# Patient Record
Sex: FEMALE | Race: BLACK OR AFRICAN AMERICAN | Employment: UNEMPLOYED | URBAN - METROPOLITAN AREA
[De-identification: names, ages, dates, MRNs, and addresses within clinical notes are randomized per-mention and may not be internally consistent; named-entity substitution may affect disease eponyms.]

---

## 2017-01-06 ENCOUNTER — HOSPITAL ENCOUNTER (EMERGENCY)
Age: 57
Discharge: HOME | End: 2017-01-06
Attending: EMERGENCY MEDICINE
Payer: MEDICARE

## 2017-01-06 ENCOUNTER — OFFICE VISIT (OUTPATIENT)
Dept: ORTHOPEDIC SURGERY | Facility: CLINIC | Age: 57
End: 2017-01-06

## 2017-01-06 VITALS
HEART RATE: 82 BPM | SYSTOLIC BLOOD PRESSURE: 115 MMHG | HEIGHT: 66 IN | DIASTOLIC BLOOD PRESSURE: 71 MMHG | BODY MASS INDEX: 36.16 KG/M2 | WEIGHT: 225 LBS

## 2017-01-06 VITALS
RESPIRATION RATE: 20 BRPM | HEIGHT: 66 IN | BODY MASS INDEX: 36.16 KG/M2 | HEART RATE: 101 BPM | DIASTOLIC BLOOD PRESSURE: 82 MMHG | SYSTOLIC BLOOD PRESSURE: 117 MMHG | WEIGHT: 225 LBS | OXYGEN SATURATION: 100 % | TEMPERATURE: 97.2 F

## 2017-01-06 DIAGNOSIS — M54.9 BACK PAIN, UNSPECIFIED BACK LOCATION, UNSPECIFIED BACK PAIN LATERALITY, UNSPECIFIED CHRONICITY: ICD-10-CM

## 2017-01-06 DIAGNOSIS — G89.29 CHRONIC PAIN OF LEFT KNEE: ICD-10-CM

## 2017-01-06 DIAGNOSIS — M17.12 PRIMARY OSTEOARTHRITIS OF LEFT KNEE: ICD-10-CM

## 2017-01-06 DIAGNOSIS — M47.816 OSTEOARTHRITIS OF LUMBAR SPINE, UNSPECIFIED SPINAL OSTEOARTHRITIS COMPLICATION STATUS: Primary | ICD-10-CM

## 2017-01-06 DIAGNOSIS — M25.562 CHRONIC PAIN OF LEFT KNEE: ICD-10-CM

## 2017-01-06 DIAGNOSIS — M54.41 ACUTE RIGHT-SIDED LOW BACK PAIN WITH RIGHT-SIDED SCIATICA: Primary | ICD-10-CM

## 2017-01-06 PROCEDURE — 74011636637 HC RX REV CODE- 636/637: Performed by: PHYSICIAN ASSISTANT

## 2017-01-06 PROCEDURE — A9270 NON-COVERED ITEM OR SERVICE: HCPCS | Performed by: PHYSICIAN ASSISTANT

## 2017-01-06 PROCEDURE — 99282 EMERGENCY DEPT VISIT SF MDM: CPT

## 2017-01-06 RX ORDER — MELOXICAM 15 MG/1
1 TABLET ORAL DAILY
COMMUNITY
Start: 2016-12-05 | End: 2017-01-06

## 2017-01-06 RX ORDER — TRAMADOL HYDROCHLORIDE 50 MG/1
50 TABLET ORAL
COMMUNITY
End: 2017-01-06

## 2017-01-06 RX ORDER — METHOCARBAMOL 500 MG/1
500 TABLET, FILM COATED ORAL 4 TIMES DAILY
Qty: 20 TAB | Refills: 0 | Status: SHIPPED | OUTPATIENT
Start: 2017-01-06 | End: 2017-02-28 | Stop reason: SDUPTHER

## 2017-01-06 RX ORDER — TRIAMCINOLONE ACETONIDE 1 MG/ML
LOTION TOPICAL
Refills: 0 | COMMUNITY
Start: 2016-10-31 | End: 2017-01-06

## 2017-01-06 RX ORDER — HYDROCODONE BITARTRATE AND ACETAMINOPHEN 7.5; 325 MG/1; MG/1
TABLET ORAL
Refills: 0 | COMMUNITY
Start: 2016-12-05 | End: 2017-01-06

## 2017-01-06 RX ORDER — PREDNISONE 20 MG/1
TABLET ORAL
Qty: 15 TAB | Refills: 0 | Status: SHIPPED | OUTPATIENT
Start: 2017-01-07 | End: 2018-01-10

## 2017-01-06 RX ORDER — HYDROCODONE BITARTRATE AND ACETAMINOPHEN 5; 325 MG/1; MG/1
1 TABLET ORAL
Qty: 20 TAB | Refills: 0 | Status: SHIPPED | OUTPATIENT
Start: 2017-01-06 | End: 2018-01-10

## 2017-01-06 RX ORDER — PREDNISONE 20 MG/1
TABLET ORAL
Refills: 0 | COMMUNITY
Start: 2016-10-18 | End: 2017-01-06

## 2017-01-06 RX ORDER — HYDRALAZINE HYDROCHLORIDE 25 MG/1
TABLET, FILM COATED ORAL
Refills: 0 | COMMUNITY
Start: 2016-11-21 | End: 2018-01-10

## 2017-01-06 RX ORDER — ALPRAZOLAM 0.5 MG/1
1 TABLET ORAL
Refills: 0 | COMMUNITY
Start: 2017-01-01

## 2017-01-06 RX ORDER — PREDNISONE 20 MG/1
60 TABLET ORAL
Status: COMPLETED | OUTPATIENT
Start: 2017-01-06 | End: 2017-01-06

## 2017-01-06 RX ORDER — TRIAMCINOLONE ACETONIDE 1 MG/G
OINTMENT TOPICAL
Refills: 0 | COMMUNITY
Start: 2016-10-25 | End: 2018-01-10

## 2017-01-06 RX ORDER — DEXTROAMPHETAMINE SACCHARATE, AMPHETAMINE ASPARTATE, DEXTROAMPHETAMINE SULFATE AND AMPHETAMINE SULFATE 5; 5; 5; 5 MG/1; MG/1; MG/1; MG/1
TABLET ORAL
COMMUNITY
Start: 2017-01-02

## 2017-01-06 RX ORDER — OXYCODONE AND ACETAMINOPHEN 5; 325 MG/1; MG/1
1 TABLET ORAL
Status: DISCONTINUED | OUTPATIENT
Start: 2017-01-06 | End: 2017-01-06 | Stop reason: HOSPADM

## 2017-01-06 RX ORDER — HYDROCODONE BITARTRATE AND ACETAMINOPHEN 5; 325 MG/1; MG/1
TABLET ORAL
Refills: 0 | COMMUNITY
Start: 2016-11-27 | End: 2017-01-06

## 2017-01-06 RX ORDER — CYCLOBENZAPRINE HCL 5 MG
2 TABLET ORAL
Refills: 0 | COMMUNITY
Start: 2016-12-09 | End: 2017-01-06

## 2017-01-06 RX ORDER — NAPROXEN 500 MG/1
1 TABLET ORAL 2 TIMES DAILY
Refills: 0 | COMMUNITY
Start: 2016-12-09 | End: 2017-01-06

## 2017-01-06 RX ORDER — DEXTROAMPHETAMINE SACCHARATE, AMPHETAMINE ASPARTATE, DEXTROAMPHETAMINE SULFATE AND AMPHETAMINE SULFATE 2.5; 2.5; 2.5; 2.5 MG/1; MG/1; MG/1; MG/1
1 TABLET ORAL 2 TIMES DAILY
Refills: 0 | COMMUNITY
Start: 2016-10-26 | End: 2018-01-10

## 2017-01-06 RX ORDER — METHYLPREDNISOLONE 4 MG/1
TABLET ORAL
COMMUNITY
Start: 2016-11-28 | End: 2017-01-06

## 2017-01-06 RX ADMIN — PREDNISONE 60 MG: 20 TABLET ORAL at 17:32

## 2017-01-06 NOTE — Clinical Note
Drink plenty of water. Please take Tylenol (Acetaminophen) Extra Strength 500 mg tablets, 2 tablets by mouth every 6-8 hours as needed for pain and or fever. NOT TO EXCEED 4000 MG OF ACETAMINOPHEN IN A 24 HOURS PERIOD. You may also take Motrin (Ibupr ofen) 200mg tablets, 3 tablets by mouth every 6-8 hours as needed for pain and or fever, to be taken with food. Do not take other NSAIDS (Non-Steroidal Anti-inflammatories) or medications containing: e.g., Celebrex (Celecoxib), Mobic (Meloxicam), Naproxe n (Naprosyn), Goodies Powder, Aspirin etc... While taking Ibuprofen (Also known as Motrin, Advil). Avoid lifting/pushing/pulling > 10 lbs x 1 week. Warm compresses to the affected area. If a splint was applied use as directed. If you have a b reak in the skin, keep the wound clean and dry. Change any dressings once daily. Use all medications as directed. Gentle range of motion as tolerated. Review patient information handouts given to you at the time of discharge as this conta ins information detrimental to your condition. PLEASE FOLLOW-UP AS DIRECTED WITHOUT FAIL WITHIN THE TIME FRAME RECOMMENDED AS FAILURE TO DO SO COULD RESULT IN WORSENING OF YOUR PHYSICAL CONDITION, DEATH, AND OR PERMANENT DISABILITY. RETURN TO THE  EMERGENCY DEPARTMENT IF YOU ARE UNABLE TO FOLLOW-UP AS DIRECTED. RETURN TO THE EMERGENCY DEPARTMENT IF YOU HAVE SYMPTOMS THAT DO NOT IMPROVE WITH TREATMENT, NEW SYMPTOMS, WORSENING SYMPTOMS, OR ANY OTHER CONCERNS.

## 2017-01-06 NOTE — ED NOTES
Written and verbal discharge instructions given. Patient verbalizes understanding of same. Patient denies  further questions about treatment and discharge instructions. Left ED with patent airway and steady gait. Arm band removed shredded. Patient left ED with 3 RX. Pt medicated w/ Percocet, dtr driving pt home.

## 2017-01-06 NOTE — ED PROVIDER NOTES
HPI Comments: Rosalba Sever is a  61 y.o. Morbidly obese  [place] female h/o R sided LBP w/ R Sided Sciatica 12/2016, L Knee Pain, DJD L Knee, HTN, NIDDM, Anxiety, ADHD presents to the ED via POV c/o dull aching right sided lower back pain that radiates down her right buttock terminating at the right knee x 1 week, which same sx as she had in early December which she was evaluated here in the ED for as well. Pain had improved but then came back. Denies fever, chills, dizziness, HA, neck pain/stiffness, cp, sob, palps, cough, leg swelling, abd pain, n/v/d, constipation, brbpr, melena, loss of bowel/bladder control, flank pain, blood in urine, saddle anesthesia, weakness, difficulty ambulating. Scheduled to see [address] [place] [address] 1/23/17 per TODAY'S DR. Hamlin Adjutant. CARLOS'S NOTE. Physical Therapy Scheduled 1/13/17. PSX:   Hysterectomy, BTL    Patient is a 61 y.o. female presenting with back pain and leg pain. Back Pain    Associated symptoms include numbness (Alternating Pain w/ Paresthesia shooting down from right lower back terminating at right knee) and leg pain. Pertinent negatives include no chest pain, no fever, no headaches, no abdominal pain, no dysuria and no weakness. Leg Pain    Associated symptoms include numbness (Alternating Pain w/ Paresthesia shooting down from right lower back terminating at right knee) and back pain (R sided pain radiating down right buttock, terminating right knee).         Past Medical History:   Diagnosis Date    Arthritis     Back pain     Diabetes ([address]      pt states \"pre-diabetes\"    Left knee pain 12/09/2015     Dr. Jason Garber       Past Surgical History:   Procedure Laterality Date    Hx myomectomy      Hx hysterectomy  2008    Hx tubal ligation           Family History:   Problem Relation Age of Onset    Heart Disease Mother     Hypertension Father     Diabetes Father        Social History     Social History    Marital status: LEGALLY  Spouse name: N/A    Number of children: N/A    Years of education: N/A     Occupational History    Not on file. Social History Main Topics    Smoking status: Former Smoker    Smokeless tobacco: Not on file    Alcohol use No    Drug use: No    Sexual activity: Not on file     Other Topics Concern    Not on file     Social History Narrative         ALLERGIES: Review of patient's allergies indicates no known allergies. Review of Systems   Constitutional: Negative for chills and fever. HENT: Negative for ear pain and sore throat. Eyes: Negative for pain and visual disturbance. Respiratory: Negative for cough and shortness of breath. Cardiovascular: Negative for chest pain and palpitations. Gastrointestinal: Negative for abdominal pain, diarrhea, nausea and vomiting. Genitourinary: Negative for decreased urine volume, difficulty urinating, dysuria, flank pain, frequency, hematuria and urgency. Musculoskeletal: Positive for arthralgias (R sided pain radiating down right buttock, terminating right knee) and back pain (R sided pain radiating down right buttock, terminating right knee). Negative for joint swelling. Skin: Negative for color change, pallor, rash and wound. Neurological: Positive for numbness (Alternating Pain w/ Paresthesia shooting down from right lower back terminating at right knee). Negative for dizziness, syncope, weakness and headaches. Psychiatric/Behavioral: Negative for behavioral problems. The patient is not nervous/anxious. Vitals:    01/06/17 1610   BP: 117/82   Pulse: (!) 101   Resp: 20   Temp: 97.2 °F (36.2 °C)   SpO2: 100%   Weight: 102.1 kg (225 lb)   Height: 5' 6\" (1.676 m)            Physical Exam   Constitutional: She is oriented to person, place, and time. She appears well-developed and well-nourished. No distress. Cardiovascular: Normal rate, regular rhythm, normal heart sounds and intact distal pulses.   Exam reveals no gallop and no friction rub.    No murmur heard. Pulmonary/Chest: Effort normal and breath sounds normal. No respiratory distress. She has no wheezes. She has no rales. Abdominal: Soft. Bowel sounds are normal. There is no tenderness. Musculoskeletal:        Right hip: Normal.        Left hip: Normal.        Right knee: Normal.        Left knee: Normal.        Thoracic back: Normal.        Lumbar back: She exhibits tenderness, pain and spasm. She exhibits normal range of motion, no bony tenderness, no swelling, no edema, no deformity, no laceration and normal pulse. Right upper leg: She exhibits no tenderness, no bony tenderness, no swelling, no edema, no deformity and no laceration. Left upper leg: Normal.        Right lower leg: Normal. She exhibits no tenderness, no bony tenderness, no swelling, no edema, no deformity and no laceration. Left lower leg: Normal. She exhibits no tenderness, no bony tenderness, no swelling, no edema, no deformity and no laceration. Lumbar: FROM w/ pain on flexion/extension. - SLR. + R Paraspinal muscle TTP. + R Piriformis region TTP. No midline TTP. MS 5/5 BUE/BLE. Gait normal. Normal inspection of the lumbar region. No calf edema/swelling or TTP. Neurological: She is alert and oriented to person, place, and time. She has normal strength. She is not disoriented. No cranial nerve deficit or sensory deficit. No focal neuro deficits. MS 5/5 BUE/BLE. Gait is normal.    Skin: Skin is warm, dry and intact. No abrasion, no bruising, no ecchymosis, no laceration and no rash noted. She is not diaphoretic. No cyanosis or erythema. No pallor. Nursing note and vitals reviewed.        MDM  Number of Diagnoses or Management Options  Acute right-sided low back pain with right-sided sciatica: new and requires workup  Diagnosis management comments: DDX: Sciatica, Lumbar radiculopathy, Back Sprain, Back Strain, DJD, HNP, Epidural abscess, Cauda Equina Syndrome, Contusion, Pyelonephritis, Renal Colic, Rib Fracture, Rib Contusion, Costochondritis, Pleurisy, Pleurodynia, Pneumothorax, Thoracic Aneurysm, Abdominal Aortic Aneurysm, Nephro-ureteral Calculus, Acute Myocardial Infarction. Afebrile, non-toxic in appearance. Reconciling meds from Ext Rx show visits to Dr. Prashant Hunt and his NP Mago Harrell, Also Dr. Negar Lynch. Also, Dr. Riya Raymundo all within 3 month period, all Family Practice Providers, not necessarily for narcotics but concerning as she only needs 1 FP. Reviewed workup results, any meds, and discharge instructions OR admission plan with patient and any family present. Answered all questions. Cynda Libman, PA  5:05 PM    PLEASE FOLLOW-UP AS DIRECTED WITHOUT FAIL WITHIN THE TIME FRAME RECOMMENDED AS FAILURE TO DO SO COULD RESULT IN WORSENING OF YOUR PHYSICAL CONDITION, DEATH, AND OR PERMANENT DISABILITY. RETURN TO THE EMERGENCY DEPARTMENT AT IF YOU ARE UNABLE TO FOLLOW-UP AS DIRECTED. RETURN TO THE EMERGENCY DEPARTMENT AT ONCE IF YOU HAVE SYMPTOMS THAT DO NOT IMPROVE WITH TREATMENT, NEW SYMPTOMS, WORSENING SYMPTOMS, OR ANY OTHER CONCERNS. THE PATIENT AGREES WITH THE DISCHARGE PLAN AND FOLLOW-UP INSTRUCTIONS. THE PATIENT AGREES TO REVIEW ALL HANDOUTS. Amount and/or Complexity of Data Reviewed  Decide to obtain previous medical records or to obtain history from someone other than the patient: yes  Review and summarize past medical records: yes    Risk of Complications, Morbidity, and/or Mortality  Presenting problems: moderate  Diagnostic procedures: low  Management options: moderate    Patient Progress  Patient progress: stable      Procedures  Diagnosis:   1.  Acute right-sided low back pain with right-sided sciatica          Disposition: HOME    Follow-up Information     Follow up With Details Comments Contact Info    [facility] EM[facility]  As needed, If symptoms worsen [address] [facility] Tiara Talley MD Call today Schedule appointment to be seen within 3 days for re-evaluation without fail. [id number] [id number] [phone]            Patient's Medications   Start Taking    HYDROCODONE-ACETAMINOPHEN (NORCO) 5-325 MG PER TABLET    Take 1 Tab by mouth every four (4) hours as needed (BREAKTHROUGH PAIN ONLY, DO NOT FILL UNLESS PREDNISONE, ROBAXIN IS FILLED.). Max Daily Amount: 6 Tabs. METHOCARBAMOL (ROBAXIN) 500 MG TABLET    Take 1 Tab by mouth four (4) times daily. PREDNISONE (DELTASONE) 20 MG TABLET    Begin taking tomorrow Saturday 1/7/17 as you received your initial dose while in the ER. 3 tabs po every day x 2 days, then 2 tabs po every day x 3 days, then 1 tab po every day x 3 days. Continue Taking    ALPRAZOLAM (XANAX) 0.5 MG TABLET    Take 1 mg by mouth nightly as needed for Anxiety or Sleep. 1/1/17, QTY#30, 30 day supply. Dr. Cecelia Rhoades (ADDERALL) 10 MG TABLET    Take 1 Tab by mouth two (2) times a day. 10/26/16, QTY#60, 30 days, Dr. Cecelia Rhoades (ADDERALL) 20 MG TABLET    1/2/17, QTY#45, 30 days. HYDRALAZINE (APRESOLINE) 25 MG TABLET    11/21/16, QTY#30, 10 days. Ms. Ro Stone. METFORMIN (GLUCOPHAGE) 500 MG TABLET    Take 500 mg by mouth daily (with breakfast). TRIAMCINOLONE ACETONIDE (KENALOG) 0.1 % OINTMENT    10/25/16, QTY#454 g, 30 days. Dr. Joyce Pandey   These Medications have changed    No medications on file   Stop Taking    CYCLOBENZAPRINE (FLEXERIL) 5 MG TABLET    Take 2 Tabs by mouth nightly. 12/9/16 QTY#10, 5 days. MARCOS Devine    HYDROCODONE-ACETAMINOPHEN (NORCO) 5-325 MG PER TABLET    11/27/16, QTY#20, 3 days, Dr. Ramona Luna    HYDROCODONE-ACETAMINOPHEN (NORCO) 7.5-325 MG PER TABLET    12/5/16, QTY#30, 8 days, Dr. Lauren Thomas    IBUPROFEN PO    Take  by mouth. MELOXICAM (MOBIC) 15 MG TABLET    Take 1 Tab by mouth daily. 12/5/16, QTY#30, 30 days,    METHYLPREDNISOLONE (MEDROL DOSEPACK) 4 MG TABLET    11/28/16, QTY#21, 6 days    NAPROXEN (NAPROSYN) 500 MG TABLET    Take 1 Tab by mouth two (2) times daily (with meals). NAPROXEN (NAPROSYN) 500 MG TABLET    Take 1 Tab by mouth two (2) times a day. 12/9/16, QTY#20 MARCOS Szymanski    PREDNISONE (DELTASONE) 20 MG TABLET    10/18/16, QTY#5    TRAMADOL (ULTRAM) 50 MG TABLET    Take 1 Tab by mouth every six (6) hours as needed for Pain. Max Daily Amount: 200 mg. Indications: NEUROPATHIC PAIN    TRAMADOL (ULTRAM) 50 MG TABLET    Take 50 mg by mouth every six (6) hours as needed for Pain. TRIAMCINOLONE (KENALOG) 0.1 % LOTION    10/31/16, OJZ950 g, 30 days. Dr. Sravanthi Kuo. Meg[place]       No results found for this or any previous visit (from the past 24 hour(s)).

## 2017-01-06 NOTE — DISCHARGE INSTRUCTIONS
InsightETE Activation    Thank you for requesting access to [facility]. Please follow the instructions below to securely access and download your online medical record. InsightETE allows you to send messages to your doctor, view your test results, renew your prescriptions, schedule appointments, and more. How Do I Sign Up? 1. In your internet browser, go to [email or web address]  2. Click on the First Time User? Click Here link in the Sign In box. You will be redirect to the New Member Sign Up page. 3. Enter your InsightETE Access Code exactly as it appears below. You will not need to use this code after youve completed the sign-up process. If you do not sign up before the expiration date, you must request a new code. InsightETE Access Code: [email or web address]  Expires: 2017 11:55 AM (This is the date your InsightETE access code will )    4. Enter the last four digits of your Social Security Number (xxxx) and Date of Birth (mm/dd/yyyy) as indicated and click Submit. You will be taken to the next sign-up page. 5. Create a InsightETE ID. This will be your InsightETE login ID and cannot be changed, so think of one that is secure and easy to remember. 6. Create a InsightETE password. You can change your password at any time. 7. Enter your Password Reset Question and Answer. This can be used at a later time if you forget your password. 8. Enter your e-mail address. You will receive e-mail notification when new information is available in [address]. 9. Click Sign Up. You can now view and download portions of your medical record. 10. Click the Download Summary menu link to download a portable copy of your medical information. Additional Information    If you have questions, please visit the Frequently Asked Questions section of the InsightETE website at [email or web address] [email or web address]. [email or web address]/. Remember, InsightETE is NOT to be used for urgent needs. For medical emergencies, dial 911.
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
## 2017-01-06 NOTE — PATIENT INSTRUCTIONS
Learning About How to Have a Healthy Back  What causes back pain? Back pain is often caused by overuse, strain, or injury. For example, people often hurt their backs playing sports or working in the yard, being jolted in a car accident, or lifting something too heavy. Aging plays a part too. Your bones and muscles tend to lose strength as you age, which makes injury more likely. The spongy discs between the bones of the spine (vertebrae) may suffer from wear and tear and no longer provide enough cushion between the bones. A disc that bulges or breaks open (herniated disc) can press on nerves, causing back pain. In some people, back pain is the result of arthritis, broken vertebrae caused by bone loss (osteoporosis), illness, or a spine problem. Although most people have back pain at one time or another, there are steps you can take to make it less likely. How can you have a healthy back? Reduce stress on your back through good posture  Slumping or slouching alone may not cause low back pain. But after the back has been strained or injured, bad posture can make pain worse. · Sleep in a position that maintains your back's normal curves and on a mattress that feels comfortable. Sleep on your side with a pillow between your knees, or sleep on your back with a pillow under your knees. These positions can reduce strain on your back. · Stand and sit up straight. \"Good posture\" generally means your ears, shoulders, and hips are in a straight line. · If you must stand for a long time, put one foot on a stool, ledge, or box. Switch feet every now and then. · Sit in a chair that is low enough to let you place both feet flat on the floor with both knees nearly level with your hips. If your chair or desk is too high, use a footrest to raise your knees. Place a small pillow, a rolled-up towel, or a lumbar roll in the curve of your back if you need extra support.   · Try a kneeling chair, which helps tilt your hips forward. This takes pressure off your lower back. · Try sitting on an exercise ball. It can rock from side to side, which helps keep your back loose. · When driving, keep your knees nearly level with your hips. Sit straight, and drive with both hands on the steering wheel. Your arms should be in a slightly bent position. Reduce stress on your back through careful lifting  · Squat down, bending at the hips and knees only. If you need to, put one knee to the floor and extend your other knee in front of you, bent at a right angle (half kneeling). · Press your chest straight forward. This helps keep your upper back straight while keeping a slight arch in your low back. · Hold the load as close to your body as possible, at the level of your belly button (navel). · Use your feet to change direction, taking small steps. · Lead with your hips as you change direction. Keep your shoulders in line with your hips as you move. · Set down your load carefully, squatting with your knees and hips only. Exercise and stretch your back  · Do some exercise on most days of the week, if your doctor says it is okay. You can walk, run, swim, or cycle. · Stretch your back muscles. Here are a few exercises to try:  Marguerite Able on your back, and gently pull one bent knee to your chest. Put that foot back on the floor, and then pull the other knee to your chest.  ¨ Do pelvic tilts. Lie on your back with your knees bent. Tighten your stomach muscles. Pull your belly button (navel) in and up toward your ribs. You should feel like your back is pressing to the floor and your hips and pelvis are slightly lifting off the floor. Hold for 6 seconds while breathing smoothly. ¨ Sit with your back flat against a wall. · Keep your core muscles strong. The muscles of your back, belly (abdomen), and buttocks support your spine. ¨ Pull in your belly and imagine pulling your navel toward your spine. Hold this for 6 seconds, then relax.  Remember to keep breathing normally as you tense your muscles. ¨ Do curl-ups. Always do them with your knees bent. Keep your low back on the floor, and curl your shoulders toward your knees using a smooth, slow motion. Keep your arms folded across your chest. If this bothers your neck, try putting your hands behind your neck (not your head), with your elbows spread apart. ¨ Lie on your back with your knees bent and your feet flat on the floor. Tighten your belly muscles, and then push with your feet and raise your buttocks up a few inches. Hold this position 6 seconds as you continue to breathe normally, then lower yourself slowly to the floor. Repeat 8 to 12 times. ¨ If you like group exercise, try Pilates or yoga. These classes have poses that strengthen the core muscles. Lead a healthy lifestyle  · Stay at a healthy weight to avoid strain on your back. · Do not smoke. Smoking increases the risk of osteoporosis, which weakens the spine. If you need help quitting, talk to your doctor about stop-smoking programs and medicines. These can increase your chances of quitting for good. Where can you learn more? Go to http://papa-kathy.info/. Enter L315 in the search box to learn more about \"Learning About How to Have a Healthy Back. \"  Current as of: May 23, 2016  Content Version: 11.1  © 5596-9942 [employer]. Care instructions adapted under license by [employer] (which disclaims liability or warranty for this information). If you have questions about a medical condition or this instruction, always ask your healthcare professional. [place] [address] any warranty or liability for your use of this information.

## 2017-01-06 NOTE — ED TRIAGE NOTES
Pt presents to ER c/o constant right lower back pain radiating to right thigh x 2 weeks. Pt states she saw Dr Crescencio Wynn today for same. He recommended physical therapy for diagnosis of sciatica. States scheduled for 1/13. States no rx was given.   States \"I need something for this pain to take\"

## 2017-01-13 ENCOUNTER — HOSPITAL ENCOUNTER (OUTPATIENT)
Dept: PHYSICAL THERAPY | Age: 57
Discharge: HOME | End: 2017-01-13
Payer: MEDICARE

## 2017-01-13 PROCEDURE — 97110 THERAPEUTIC EXERCISES: CPT

## 2017-01-13 PROCEDURE — 97162 PT EVAL MOD COMPLEX 30 MIN: CPT

## 2017-01-13 PROCEDURE — G8984 CARRY CURRENT STATUS: HCPCS

## 2017-01-13 PROCEDURE — G8985 CARRY GOAL STATUS: HCPCS

## 2017-01-13 NOTE — PROGRESS NOTES
[facility]  [address]  [place], [address]  [phone] fax    Plan of Care/ Statement of Necessity for Physical Therapy Services    Patient name: Nilson Tipton Start of Care: 2017   Referral source: Amee Orta MD : 1960    Medical Diagnosis: Spondylosis without myelopathy or radiculopathy, lumbar region [M47.816]  Dorsalgia, unspecified [M54.9]   Onset Date:1 month    Treatment Diagnosis: SIJ dysfunction & LBP   Prior Hospitalization: see medical history Provider#: [id number]   Medications: Verified on Patient summary List    Comorbidities: L knee OA, diabetes, Arthritis, none other per pt report   Prior Level of Function: no LBP, walking for exercise, [facility] including [facility] without limitations d/t LBP      The Plan of Care and following information is based on the information from the initial evaluation. Assessment/ key information: Pt is a 61year old female presenting with insidious onset of LBP with intermittent referral or RLE paresthesias primarily in ant thigh & sometimes referring to the dorsum of the R foot. Pt reports pain limits standing, walking tolerance and ease of housework. Signs and symptoms appear consistent with SIJ dysfunction with R sided myofascial restrictions primarily around the R gluteals/piriformis with (+) R SIMI, (+) SIJ compression, (+) R gaenslyn's, (+) R thigh thrust, limited R hip ER mobility, TTP R PSIS, antalgic gait pattern, and weakness in R gluteals. Pt will benefit from skilled PT to address her impairments and improve her level of function.     Evaluation Complexity History MEDIUM  Complexity : 1-2 comorbidities / personal factors will impact the outcome/ POC ; Examination MEDIUM Complexity : 3 Standardized tests and measures addressing body structure, function, activity limitation and / or participation in recreation  ;Presentation LOW Complexity : Stable, uncomplicated  ;Clinical Decision Making MEDIUM Complexity : FOTO score of 26-74  Overall Complexity Rating: MEDIUM  Problem List: pain affecting function, decrease ROM, decrease strength, impaired gait/ balance, decrease ADL/ functional abilitiies, decrease activity tolerance, decrease flexibility/ joint mobility and decrease transfer abilities   Treatment Plan may include any combination of the following: Therapeutic exercise, Therapeutic activities, Neuromuscular re-education, Physical agent/modality, Manual therapy, Patient education, Self Care training and Functional mobility training  Patient / Family readiness to learn indicated by: asking questions, trying to perform skills and interest  Persons(s) to be included in education: patient (P)  Barriers to Learning/Limitations: None  Patient Goal (s): To get back the way I was at first.  Patient Self Reported Health Status: Not reported  Rehabilitation Potential: fair    Short Term Goals: To be accomplished in 2 weeks:   1. Patient will report performance of HEP at least 2 times per day to facilitate improved outcomes and improved self management. Long Term Goals: To be accomplished in 4 weeks:   1. Patient will report FOTO score of 61 or better to indicate significant improvement in functional status. 2. Pt will demonstrate (-) SIMI and thigh thrust pain provocation to improve ease of daily activity. 3. Pt will demonstrate R hip prone ER to 30 degrees or greater without pain provocation to improve ADL ease. 4. Pt will demonstrate ability to squat x 10 without pain to improve ease of transfers. Frequency / Duration: Patient to be seen 2 times per week for 4 weeks.     Patient/ Caregiver education and instruction: Diagnosis, prognosis, self care, activity modification and exercises   [x]  Plan of care has been reviewed with PTA    G-Codes (GP)    Carry   Current  CL= 60-79%    Goal  CJ= 20-39%    The severity rating is based on clinical judgment and the FOTO score. Certification Period: 1/13/2017 - 3/13/2017  Clarence Mai, PT, DPT, ATC, CSCS 1/13/2017 3:41 PM    ________________________________________________________________________    I certify that the above Therapy Services are being furnished while the patient is under my care. I agree with the treatment plan and certify that this therapy is necessary.     [de-identified] Signature:____________________  Date:____________Time: _________    Please sign and return to [facility]  [address] [place] [address], [address]  [phone] fax
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
## 2017-01-17 ENCOUNTER — APPOINTMENT (OUTPATIENT)
Dept: PHYSICAL THERAPY | Age: 57
End: 2017-01-17
Payer: MEDICARE

## 2017-01-19 ENCOUNTER — APPOINTMENT (OUTPATIENT)
Dept: PHYSICAL THERAPY | Age: 57
End: 2017-01-19
Payer: MEDICARE

## 2017-01-23 ENCOUNTER — OFFICE VISIT (OUTPATIENT)
Dept: ORTHOPEDIC SURGERY | Age: 57
End: 2017-01-23

## 2017-01-23 VITALS
DIASTOLIC BLOOD PRESSURE: 65 MMHG | BODY MASS INDEX: 34.25 KG/M2 | WEIGHT: 218.2 LBS | TEMPERATURE: 98.5 F | SYSTOLIC BLOOD PRESSURE: 116 MMHG | RESPIRATION RATE: 16 BRPM | HEIGHT: 67 IN | HEART RATE: 73 BPM

## 2017-01-23 DIAGNOSIS — M54.41 CHRONIC MIDLINE LOW BACK PAIN WITH RIGHT-SIDED SCIATICA: Primary | ICD-10-CM

## 2017-01-23 DIAGNOSIS — G89.29 CHRONIC MIDLINE LOW BACK PAIN WITH RIGHT-SIDED SCIATICA: Primary | ICD-10-CM

## 2017-01-23 DIAGNOSIS — M79.2 NEURITIS: ICD-10-CM

## 2017-01-23 DIAGNOSIS — M47.816 LUMBAR FACET ARTHROPATHY: ICD-10-CM

## 2017-01-23 RX ORDER — KETOROLAC TROMETHAMINE 15 MG/ML
60 INJECTION, SOLUTION INTRAMUSCULAR; INTRAVENOUS ONCE
Qty: 1 VIAL | Refills: 0
Start: 2017-01-23 | End: 2017-01-23

## 2017-01-23 RX ORDER — GABAPENTIN 300 MG/1
CAPSULE ORAL
Qty: 90 CAP | Refills: 1 | Status: SHIPPED | OUTPATIENT
Start: 2017-01-23 | End: 2017-03-21 | Stop reason: SDUPTHER

## 2017-01-23 NOTE — MR AVS SNAPSHOT
Visit Information Date & Time Provider Department Dept. Phone Encounter #  
 1/23/2017  9:00 AM Anup Valadez, 27 [facility] [phone] [id number] Follow-up Instructions Return in about 1 month (around 2/23/2017) for Medication follow up, PT follow up. Upcoming Health Maintenance Date Due Hepatitis C Screening 1960 HEMOGLOBIN A1C Q6M 1959 FOOT EXAM Q1 1/28/1969 MICROALBUMIN Q1 1/28/1969 EYE EXAM RETINAL OR DILATED Q1 1/28/1969 Pneumococcal 19-64 Medium Risk (1 of 1 - PPSV23) 1/28/1978 DTaP/Tdap/Td series (1 - Tdap) 1/28/1980 PAP AKA CERVICAL CYTOLOGY 1/28/1980 FOBT Q 1 YEAR AGE 50-75 1/28/2009 LIPID PANEL Q1 6/21/2013 INFLUENZA AGE 9 TO ADULT 8/1/2016 BREAST CANCER SCRN MAMMOGRAM 7/26/2018 Allergies as of 1/23/2017  Review Complete On: 1/23/2017 By: Anup Valadez MD  
 No Known Allergies Current Immunizations  Never Reviewed No immunizations on file. Not reviewed this visit You Were Diagnosed With   
  
 Codes Comments Chronic midline low back pain with right-sided sciatica    -  Primary ICD-10-CM: M54.41, G89.29 ICD-9-CM: 724.2, 724.3, 338.29 Neuritis     ICD-10-CM: M79.2 ICD-9-CM: 729.2 Lumbar facet arthropathy (HCC)     ICD-10-CM: M12.88 ICD-9-CM: 721.3 Vitals BP Pulse Temp Resp Height(growth percentile) Weight(growth percentile) 116/65 73 98.5 °F (36.9 °C) (Oral) 16 5' 7\" (1.702 m) 218 lb 3.2 oz (99 kg) BMI OB Status Smoking Status 34.17 kg/m2 Hysterectomy Former Smoker BMI and BSA Data Body Mass Index Body Surface Area  
 34.17 kg/m 2 2.16 m 2 Preferred Pharmacy Pharmacy Name Phone [address] [facility] [address] [facility] [phone] Your Updated Medication List  
  
   
This list is accurate as of: 1/23/17 10:17 AM.  Always use your most recent med list.  
  
  
  
  
 ALPRAZolam 0.5 mg tablet Commonly known as:  Serena Mabry Take 1 mg by mouth nightly as needed for Anxiety or Sleep. 1/1/17, QTY#30, 30 day supply. Dr. Kuldip Deleon * dextroamphetamine-amphetamine 10 mg tablet Commonly known as:  ADDERALL Take 1 Tab by mouth two (2) times a day. 10/26/16, QTY#60, 30 days, Dr. Kuldip Deleon * dextroamphetamine-amphetamine 20 mg tablet Commonly known as:  ADDERALL  
1/2/17, QTY#45, 30 days. gabapentin 300 mg capsule Commonly known as:  NEURONTIN Take 1 po q am and 2 po q pm as directed  
  
 hydrALAZINE 25 mg tablet Commonly known as:  APRESOLINE  
11/21/16, QTY#30, 10 days. Ms. Augustin [place]. HYDROcodone-acetaminophen 5-325 mg per tablet Commonly known as:  Rosita President Take 1 Tab by mouth every four (4) hours as needed (BREAKTHROUGH PAIN ONLY, DO NOT FILL UNLESS PREDNISONE, ROBAXIN IS FILLED.). Max Daily Amount: 6 Tabs.  
  
 ketorolac 15 mg/mL Soln injection Commonly known as:  TORADOL  
4 mL by IntraMUSCular route once for 1 dose. metFORMIN 500 mg tablet Commonly known as:  GLUCOPHAGE Take 500 mg by mouth daily (with breakfast). methocarbamol 500 mg tablet Commonly known as:  ROBAXIN Take 1 Tab by mouth four (4) times daily. predniSONE 20 mg tablet Commonly known as:  Atalissa Esters Begin taking tomorrow Saturday 1/7/17 as you received your initial dose while in the ER. 3 tabs po every day x 2 days, then 2 tabs po every day x 3 days, then 1 tab po every day x 3 days. triamcinolone acetonide 0.1 % ointment Commonly known as:  KENALOG  
10/25/16, QTY#454 g, 30 days. Dr. Terri Doll * Notice: This list has 2 medication(s) that are the same as other medications prescribed for you. Read the directions carefully, and ask your doctor or other care provider to review them with you. Prescriptions Sent to Pharmacy Refills gabapentin (NEURONTIN) 300 mg capsule 1 Sig: Take 1 po q am and 2 po q pm as directed Class: Normal  
 Pharmacy: [address] [place], [place] [address] #: [phone] We Performed the Following KETOROLAC TROMETHAMINE INJ [ [facility]] [place] THER/PROPH/DIAG INJECTION, SUBCUT/IM [id number] CPT(R)] Follow-up Instructions Return in about 1 month (around 2017) for Medication follow up, PT follow up. To-Do List   
 2017 3:00 PM  
  Appointment with Watson Lucas at [facility] PT [address] [place] [address] Road ([phone])  
  
 2017 11:30 AM  
  Appointment with Watson Lucas at [facility] PT [address] [place] [address] Road ([phone])  
  
 2017 11:30 AM  
  [facility] ([phone][facility]  
  
 2017 10:30 AM  
  Appointment with Silvio Cortez PTA at [facility] PT [address] [place] [address] Road ([phone]) 2017 10:30 AM  
  Appointment with Watson Lucas at [address] ([phone]) Patient Instructions [employer] Activation Thank you for requesting access to [employer]. Please follow the instructions below to securely access and download your online medical record. [employer] allows you to send messages to your doctor, view your test results, renew your prescriptions, schedule appointments, and more. How Do I Sign Up? 1. In your internet browser, go to [email or web address].Meet You 
2. Click on the First Time User? Click Here link in the Sign In box. You will be redirect to the New Member Sign Up page. 3. Enter your [employer] Access Code exactly as it appears below. You will not need to use this code after youve completed the sign-up process. If you do not sign up before the expiration date, you must request a new code. [employer] Access Code: [email or web address] Expires: 2017 11:55 AM (This is the date your [employer] access code will ) 4. Enter the last four digits of your Social Security Number (xxxx) and Date of Birth (mm/dd/yyyy) as indicated and click Submit. You will be taken to the next sign-up page. 5. Create a [employer] ID. This will be your [employer] login ID and cannot be changed, so think of one that is secure and easy to remember. 6. Create a [employer] password. You can change your password at any time. 7. Enter your Password Reset Question and Answer. This can be used at a later time if you forget your password. 8. Enter your e-mail address. You will receive e-mail notification when new information is available in [address] 9. Click Sign Up. You can now view and download portions of your medical record. 10. Click the Download Summary menu link to download a portable copy of your medical information. Additional Information If you have questions, please visit the Frequently Asked Questions section of the [employer] website at [email or web address]://[email or web address]. Thubrikar Aortic Valve/Enhanced Surface Dynamicst/. Remember, [employer] is NOT to be used for urgent needs. For medical emergencies, dial 911. Introducing [facility]! Regis Young introduces [employer] patient portal. Now you can access parts of your medical record, email your doctor's office, and request medication refills online. 1. In your internet browser, go to [email or web address]. Thubrikar Aortic Valve/Enhanced Surface Dynamicst 2. Click on the First Time User? Click Here link in the Sign In box. You will see the New Member Sign Up page. 3. Enter your [employer] Access Code exactly as it appears below. You will not need to use this code after youve completed the sign-up process. If you do not sign up before the expiration date, you must request a new code. · [employer] Access Code: [email or web address] Expires: 4/6/2017 11:55 AM 
 
4. Enter the last four digits of your Social Security Number (xxxx) and Date of Birth (mm/dd/yyyy) as indicated and click Submit. You will be taken to the next sign-up page. 5. Create a [employer] ID. This will be your [employer] login ID and cannot be changed, so think of one that is secure and easy to remember. 6. Create a [employer] password. You can change your password at any time. 7. Enter your Password Reset Question and Answer. This can be used at a later time if you forget your password. 8. Enter your e-mail address. You will receive e-mail notification when new information is available in [address] 9. Click Sign Up. You can now view and download portions of your medical record. 10. Click the Download Summary menu link to download a portable copy of your medical information. If you have questions, please visit the Frequently Asked Questions section of the [employer] website. Remember, [employer] is NOT to be used for urgent needs. For medical emergencies, dial 911. Now available from your iPhone and Android! Please provide this summary of care documentation to your next provider. Your primary care clinician is listed as [address] If you have any questions after today's visit, please call [phone].

## 2017-01-23 NOTE — PATIENT INSTRUCTIONS
[employer] Activation    Thank you for requesting access to [employer]. Please follow the instructions below to securely access and download your online medical record. [employer] allows you to send messages to your doctor, view your test results, renew your prescriptions, schedule appointments, and more. How Do I Sign Up? 1. In your internet browser, go to [email or web address]  2. Click on the First Time User? Click Here link in the Sign In box. You will be redirect to the New Member Sign Up page. 3. Enter your [employer] Access Code exactly as it appears below. You will not need to use this code after youve completed the sign-up process. If you do not sign up before the expiration date, you must request a new code. EVERY[employer] Access Code: [email or web address]  Expires: 2017 11:55 AM (This is the date your [employer] access code will )    4. Enter the last four digits of your Social Security Number (xxxx) and Date of Birth (mm/dd/yyyy) as indicated and click Submit. You will be taken to the next sign-up page. 5. Create a [employer] ID. This will be your EVERYWARE login ID and cannot be changed, so think of one that is secure and easy to remember. 6. Create a EVERYWARE password. You can change your password at any time. 7. Enter your Password Reset Question and Answer. This can be used at a later time if you forget your password. 8. Enter your e-mail address. You will receive e-mail notification when new information is available in [address] 9. Click Sign Up. You can now view and download portions of your medical record. 10. Click the Download Summary menu link to download a portable copy of your medical information. Additional Information    If you have questions, please visit the Frequently Asked Questions section of the EVERYWARE website at [email or web address]. Light[employer] by [employer]. Techpoint/4 the starshart/. Remember, EVERYWARE is NOT to be used for urgent needs. For medical emergencies, dial 911.

## 2017-01-23 NOTE — PROGRESS NOTES
JAH per Dr. Cliffton Rubinstein ordered Neurontin 300mg take 1 po q am and 2 po q pm #90  RF1 faxed to the patients pharmacy
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
## 2017-01-24 ENCOUNTER — HOSPITAL ENCOUNTER (OUTPATIENT)
Dept: PHYSICAL THERAPY | Age: 57
Discharge: HOME | End: 2017-01-24
Payer: MEDICARE

## 2017-01-24 PROCEDURE — 97110 THERAPEUTIC EXERCISES: CPT

## 2017-01-24 PROCEDURE — 97112 NEUROMUSCULAR REEDUCATION: CPT

## 2017-01-24 NOTE — PROGRESS NOTES
PT DAILY TREATMENT NOTE - [facility]     Patient Name: Sharmin Chen  Date:2017  : 1960  [x]  Patient  Verified  Payor: [place] MEDICARE / Plan: [place] MEDICARE PART A & B / Product Type: Medicare /    In time:3:00pm  Out time:3:55pm  Total Treatment Time (min): 55  Total Timed Codes (min): 45  1:1 Treatment Time ( [address] only): 44   Visit #: 2 of 8    Treatment Area: Low back pain [M54.5]  Spondylosis without myelopathy or radiculopathy, lumbar region [M47.816]  Dorsalgia, unspecified [M54.9]    SUBJECTIVE  Pain Level (0-10 scale): 5-6  Any medication changes, allergies to medications, adverse drug reactions, diagnosis change, or new procedure performed?: [x] No    [] Yes (see summary sheet for update)  Subjective functional status/changes:   [] No changes reported  \"I'm mostly just tired today. \" Pt reports some relief since seeing spine specialist as she was given new medication and an injection, but is unsure what it was she was given. Reports HEP performance \"when I feel up to it\" which she reports is 2-3x/day. OBJECTIVE  32 min Therapeutic Exercise:  [x] See flow sheet :   Rationale: increase ROM and increase strength to improve the patients ability to improve daily activity performance. 10 min Neuromuscular Re-education:  [x]  See flow sheet :   Rationale: increase strength, improve coordination and increase proprioception  to improve the patients ability to improve daily activity performance and improve gluteal activation.      3 min Manual Therapy:  Level pelvic alignment assessment, manual held otherwise d/t absent TrP throughout gluteals and L/S paraspinals   Rationale:       Modality rationale: decrease pain and increase tissue extensibility to improve the patients ability to improve ADL ease   Min Type Additional Details    [] Estim:  []Unatt       []IFC  []Premod                        []Other:  []w/ice   []w/heat  Position:  Location:    [] Estim: []Att    []TENS instruct  []NMES []Other:  []w/US   []w/ice   []w/heat  Position:  Location:    []  Traction: [] Cervical       []Lumbar                       [] Prone          []Supine                       []Intermittent   []Continuous Lbs:  [] before manual  [] after manual    []  Ultrasound: []Continuous   [] Pulsed                           []1MHz   []3MHz Location:  W/cm2:    []  Iontophoresis with dexamethasone         Location: [] Take home patch   [] In clinic   10 []  Ice     [x]  heat  []  Ice massage  []  Laser   []  Anodyne Position: prone  Location: L/S    []  Laser with stim  []  Other: Position:  Location:    []  Vasopneumatic Device Pressure:       [] lo [] med [] hi   Temperature: [] lo [] med [] hi   [] Skin assessment post-treatment:  []intact []redness- no adverse reaction    []redness - adverse reaction:         With   [] TE   [] TA   [] neuro   [] other: Patient Education: [x] Review HEP    [] Progressed/Changed HEP based on:   [] positioning   [] body mechanics   [] transfers   [] heat/ice application    [] other:      Other Objective/Functional Measures:     Requires tactile cues to facilitate hip movement during hip ER/IR, compensates with trunk/pelvic rotation     Level pelvic alignment, mild TTP reported R PSIS and R SIJ region, no notable TrP noted in gluteals    Mild pain reproduction reported with end range hip ER on prone    Struggles with hip ext requiring near constant cueing for gluteal activation, compensates with HS bilat    Cues to facilitate effort frequently    Pain Level (0-10 scale) post treatment: 5    ASSESSMENT/Changes in Function: Pt demonstrates poor gluteal activation bilat with some improvement with frequent cues, limited carryover. Reports small reduction in pain post treatment. Continue per POC.     Patient will continue to benefit from skilled PT services to modify and progress therapeutic interventions, address functional mobility deficits, address ROM deficits, address strength deficits, analyze and address soft tissue restrictions, analyze and cue movement patterns, analyze and modify body mechanics/ergonomics and assess and modify postural abnormalities to attain remaining goals. []  See Plan of Care  []  See progress note/recertification  []  See Discharge Summary         Progress towards goals / Updated goals:  Short Term Goals: To be accomplished in 2 weeks:  1. Patient will report performance of HEP at least 2 times per day to facilitate improved outcomes and improved self management. Progressing per pt report 1/24/2017     Long Term Goals: To be accomplished in 4 weeks:  1. Patient will report FOTO score of 61 or better to indicate significant improvement in functional status. 2. Pt will demonstrate (-) SIMI and thigh thrust pain provocation to improve ease of daily activity. 3. Pt will demonstrate R hip prone ER to 30 degrees or greater without pain provocation to improve ADL ease. 4. Pt will demonstrate ability to squat x 10 without pain to improve ease of transfers. Frequency / Duration: Patient to be seen 2 times per week for 4 weeks.     PLAN  [x]  Upgrade activities as tolerated     [x]  Continue plan of care  []  Update interventions per flow sheet       []  Discharge due to:_  []  Other:_      Melvin Schmid, PT, DPT, ATC, CSCS 1/24/2017  3:00 PM    Future Appointments  Date Time Provider Danni Mercadoi   1/26/2017 11:30 AM Hall Moritz [facility]   1/30/2017 11:30 AM [facility]   2/1/2017 10:30 AM Wale Lombardo PTA [facility]   2/6/2017 10:30 [facility]   2/23/2017 11:30 AM Bia Crisostomo  [address]

## 2017-01-26 ENCOUNTER — HOSPITAL ENCOUNTER (OUTPATIENT)
Dept: PHYSICAL THERAPY | Age: 57
Discharge: HOME | End: 2017-01-26
Payer: MEDICARE

## 2017-01-26 PROCEDURE — 97112 NEUROMUSCULAR REEDUCATION: CPT

## 2017-01-26 PROCEDURE — 97110 THERAPEUTIC EXERCISES: CPT

## 2017-01-26 NOTE — PROGRESS NOTES
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 \"    OBJECTIVE  22 min Therapeutic Exercise:  [x] See flow sheet :   Rationale: increase ROM and increase strength to improve the patients ability to improve ADL ease. 14 min Neuromuscular Re-education:  [x]  See flow sheet :   Rationale: increase strength, improve coordination and increase proprioception  to improve the patients ability to improve gluteal activation to improve core stability during functional activity    Modality rationale: decrease pain and increase tissue extensibility to improve the patients ability to improve ADL ease.    Min Type Additional Details    [] Estim:  []Unatt       []IFC  []Premod                        []Other:  []w/ice   []w/heat  Position:  Location:    [] Estim: []Att    []TENS instruct  []NMES                    []Other:  []w/US   []w/ice   []w/heat  Position:  Location:    []  Traction: [] Cervical       []Lumbar                       [] Prone          []Supine                       []Intermittent   []Continuous Lbs:  [] before manual  [] after manual    []  Ultrasound: []Continuous   [] Pulsed                           []1MHz   []3MHz Location:  W/cm2:    []  Iontophoresis with dexamethasone         Location: [] Take home patch   [] In clinic   10 []  Ice     [x]  heat  []  Ice massage  []  Laser   []  Anodyne Position: prone  Location: L/S    []  Laser with stim  []  Other: Position:  Location:    []  Vasopneumatic Device Pressure:       [] lo [] med [] hi   Temperature: [] lo [] med [] hi   [] Skin assessment post-treatment:  []intact []redness- no adverse reaction    []redness - adverse reaction:           With   [] TE   [] TA   [] neuro   [] other: Patient Education: [x] Review HEP    [] Progressed/Changed HEP based on:   [] positioning   [] body mechanics   [] transfers   [] heat/ice application    [] other:      Other Objective/Functional Measures:     Level pelvic alignment, minimal TTP R PSIS & R SIJ, no TTP noted throughout R gluteals    Gluteal inhibition bilat R > L noted with interventions requiring frequent cues, struggles with ext SLR with HS compensation    Tactile Cues to facilitate hip ER/IR mobility    Requires instruction for technique for 100% of interventions     Pain Level (0-10 scale) post treatment: 0    ASSESSMENT/Changes in Function: Pt progressing per reports of decreasing pain, however, continues to demonstrates some gluteal inhibition grossly R > L with minimal TTP R SIJ. Will continue with progression of core strength/stability interventions, hip mobility, and progression towards functional movement based interventions. Patient will continue to benefit from skilled PT services to modify and progress therapeutic interventions, address functional mobility deficits, address ROM deficits, address strength deficits, analyze and address soft tissue restrictions, analyze and cue movement patterns, analyze and modify body mechanics/ergonomics and assess and modify postural abnormalities to attain remaining goals.      []  See Plan of Care  []  See progress note/recertification  []  See Discharge Summary         Progress towards goals / Updated goals:  Short Term Goals: To be accomplished in 2 weeks:  1. Patient will report performance of HEP at least 2 times per day to facilitate improved outcomes and improved self management. Progressing per pt report 1/24/2017      Long Term Goals: To be accomplished in 4 weeks:  1. Patient will report FOTO score of 61 or better to indicate significant improvement in functional status. 2. Pt will demonstrate (-) SIMI and thigh thrust pain provocation to improve ease of daily activity. 3. Pt will demonstrate R hip prone ER to 30 degrees or greater without pain provocation to improve ADL ease. 4. Pt will demonstrate ability to squat x 10 without pain to improve ease of transfers. Frequency / Duration: Patient to be seen 2 times per week for 4 weeks.     PLAN  [x]  Upgrade activities as tolerated     [x]  Continue plan of care  []  Update interventions per flow sheet       []  Discharge due to:_  []  Other:_      Afshin Metz, PT, DPT, ATC, CSCS 1/26/2017  11:43 AM    Future Appointments  Date Time Provider Danni Soria   1/30/2017 11:30 [facility]   2/1/2017 10:30 AM Rui Eli PTA [facility]   2/6/2017 10:30 AM Afshin Metz [facility]   2/23/2017 11:30 AM Abel Essex,  [address]

## 2017-01-30 ENCOUNTER — HOSPITAL ENCOUNTER (OUTPATIENT)
Dept: PHYSICAL THERAPY | Age: 57
Discharge: HOME | End: 2017-01-30
Payer: MEDICARE

## 2017-01-30 PROCEDURE — 97110 THERAPEUTIC EXERCISES: CPT

## 2017-01-30 PROCEDURE — 97112 NEUROMUSCULAR REEDUCATION: CPT

## 2017-01-30 NOTE — PROGRESS NOTES
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 OBJECTIVE    Modality rationale: decrease inflammation and decrease pain to improve the patients ability to tolerate ADLs.    Min Type Additional Details    [] Estim:  []Unatt       []IFC  []Premod                        []Other:  []w/ice   []w/heat  Position:  Location:    [] Estim: []Att    []TENS instruct  []NMES                    []Other:  []w/US   []w/ice   []w/heat  Position:  Location:    []  Traction: [] Cervical       []Lumbar                       [] Prone          []Supine                       []Intermittent   []Continuous Lbs:  [] before manual  [] after manual    []  Ultrasound: []Continuous   [] Pulsed                           []1MHz   []3MHz W/cm2:  Location:    []  Iontophoresis with dexamethasone         Location: [] Take home patch   [] In clinic    []  Ice     []  heat  []  Ice massage  []  Laser   []  Anodyne Position:  Location:    []  Laser with stim  []  Other:  Position:  Location:    []  Vasopneumatic Device Pressure:       [] lo [] med [] hi   Temperature: [] lo [] med [] hi   [] Skin assessment post-treatment:  []intact []redness- no adverse reaction    []redness - adverse reaction:     30 min Therapeutic Exercise:  [x] See flow sheet :   Rationale: increase ROM and increase strength to improve the patients ability to tolerate ADLs. 10 min Neuromuscular Re-education:  [x]  See flow sheet :   Rationale: increase strength, improve coordination and increase proprioception  to improve the patients ability to perform functional ADLs. With   [] TE   [] TA   [] neuro   [] other: Patient Education: [x] Review HEP    [] Progressed/Changed HEP based on:   [] positioning   [] body mechanics   [] transfers   [] heat/ice application    [] other:      Other Objective/Functional Measures: Significant lateral shift with QP LE extension. Pain Level (0-10 scale) post treatment: 0/10    ASSESSMENT/Changes in Function: Pt demonstrates continued lack of strength and core stability with core exercises. Patient will continue to benefit from skilled PT services to modify and progress therapeutic interventions, address functional mobility deficits, address ROM deficits, address strength deficits and analyze and address soft tissue restrictions to attain remaining goals. []  See Plan of Care  []  See progress note/recertification  []  See Discharge Summary         Progress towards goals / Updated goals:  Short Term Goals: To be accomplished in 2 weeks:  1. Patient will report performance of HEP at least 2 times per day to facilitate improved outcomes and improved self management. Progressing per pt report 1/24/2017      Long Term Goals: To be accomplished in 4 weeks:  1. Patient will report FOTO score of 61 or better to indicate significant improvement in functional status. 2. Pt will demonstrate (-) SIMI and thigh thrust pain provocation to improve ease of daily activity. 3. Pt will demonstrate R hip prone ER to 30 degrees or greater without pain provocation to improve ADL ease.   4. Pt will demonstrate ability to squat x 10 without pain to improve ease of transfers    PLAN  [] Upgrade activities as tolerated     [x]  Continue plan of care  []  Update interventions per flow sheet       []  Discharge due to:_  []  Other:_      France Dobbins PTA 1/30/2017  12:02 PM    Future Appointments  Date Time Provider Danni Soria   2/1/2017 10:30 AM Elan Patel PTA [facility]   2/6/2017 10:30 AM Brenda Escobedo [facility]   2/28/2017 9:15 AM Rosalba Alcantara  [address]

## 2017-02-01 ENCOUNTER — HOSPITAL ENCOUNTER (OUTPATIENT)
Dept: PHYSICAL THERAPY | Age: 57
Discharge: HOME | End: 2017-02-01
Payer: MEDICARE

## 2017-02-01 PROCEDURE — 97112 NEUROMUSCULAR REEDUCATION: CPT

## 2017-02-01 PROCEDURE — 97110 THERAPEUTIC EXERCISES: CPT

## 2017-02-01 NOTE — PROGRESS NOTES
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 \"     OBJECTIVE    Modality rationale: NA   Min Type Additional Details    [] Estim:  []Unatt       []IFC  []Premod                        []Other:  []w/ice   []w/heat  Position:  Location:    [] Estim: []Att    []TENS instruct  []NMES                    []Other:  []w/US   []w/ice   []w/heat  Position:  Location:    []  Traction: [] Cervical       []Lumbar                       [] Prone          []Supine                       []Intermittent   []Continuous Lbs:  [] before manual  [] after manual    []  Ultrasound: []Continuous   [] Pulsed                           []1MHz   []3MHz W/cm2:  Location:    []  Iontophoresis with dexamethasone         Location: [] Take home patch   [] In clinic    []  Ice     []  heat  []  Ice massage  []  Laser   []  Anodyne Position:  Location:    []  Laser with stim  []  Other:  Position:  Location:    []  Vasopneumatic Device Pressure:       [] lo [] med [] hi   Temperature: [] lo [] med [] hi   [] Skin assessment post-treatment:  []intact []redness- no adverse reaction    []redness  adverse reaction:     31 min Therapeutic Exercise:  [x] See flow sheet : Rationale: increase ROM and increase strength to improve the patients ability to perform ADL's. 10 min Neuromuscular Re-education:  [x]  See flow sheet :   Rationale: increase strength and increase proprioception  to improve the patients ability to perform functional activities. With   [x] TE   [] TA   [] neuro   [] other: Patient Education: [x] Review HEP    [] Progressed/Changed HEP based on:   [] positioning   [] body mechanics   [] transfers   [] heat/ice application    [] other:      Other Objective/Functional Measures: AROM (R) Hip ER in prone 28 degrees. Poor proprioception, frequent instructions and cueing for positioning and mechanics. Pain Level (0-10 scale) post treatment: 0/10    ASSESSMENT/Changes in Function: FOTO 44%. Patient will continue to benefit from skilled PT services to modify and progress therapeutic interventions, address functional mobility deficits, address ROM deficits, address strength deficits, analyze and cue movement patterns and analyze and modify body mechanics/ergonomics to attain remaining goals. [x]  See Plan of Care  []  See progress note/recertification  []  See Discharge Summary         Progress towards goals / Updated goals:  Short Term Goals: To be accomplished in 2 weeks:  1. Patient will report performance of HEP at least 2 times per day to facilitate improved outcomes and improved self management. Progressing per pt report 1/24/2017; Partial, 2-3x a week. 2/1/2017  Long Term Goals: To be accomplished in 4 weeks:  1. Patient will report FOTO score of 61 or better to indicate significant improvement in functional status. - FOTO 44%. 2/1/2017  2. Pt will demonstrate (-) SIMI and thigh thrust pain provocation to improve ease of daily activity. 3. Pt will demonstrate R hip prone ER to 30 degrees or greater without pain provocation to improve ADL ease. - AROM (R) Hip ER in prone 28 degrees. 2/1/2017  4.  Pt will demonstrate ability to squat x 10 without pain to improve ease of transfers    PLAN  []  Upgrade activities as tolerated     [x]  Continue plan of care  []  Update interventions per flow sheet       []  Discharge due to:_  []  Other:_      aGbriella Sam PTA 2/1/2017  11:00 AM    Future Appointments  Date Time Provider Danni Soria   2/6/2017 10:30 AM [address] [place] [address] [facility]   2/28/2017 9:15 AM Carie Carson  [address]

## 2017-02-06 ENCOUNTER — HOSPITAL ENCOUNTER (OUTPATIENT)
Dept: PHYSICAL THERAPY | Age: 57
Discharge: HOME | End: 2017-02-06
Payer: MEDICARE

## 2017-02-06 PROCEDURE — 97110 THERAPEUTIC EXERCISES: CPT

## 2017-02-06 PROCEDURE — 97112 NEUROMUSCULAR REEDUCATION: CPT

## 2017-02-06 NOTE — PROGRESS NOTES
PT DAILY TREATMENT NOTE - [facility]     Patient Name: Darron Maldonado  Date:2017  : 1960  [x]  Patient  Verified  Payor: [place] MEDICARE / Plan: [place] MEDICARE PART A & B / Product Type: Medicare /    In time:10:30am  Out time:11:14am  Total Treatment Time (min): 44  Total Timed Codes (min): 44  1:1 Treatment Time ([facility] only): 28  Visit #: 6 of 8    Treatment Area: Low back pain [M54.5]  Spondylosis without myelopathy or radiculopathy, lumbar region [M47.816]  Dorsalgia, unspecified [M54.9]    SUBJECTIVE  Pain Level (0-10 scale): 0  Any medication changes, allergies to medications, adverse drug reactions, diagnosis change, or new procedure performed?: [x] No    [] Yes (see summary sheet for update)  Subjective functional status/changes:   [] No changes reported  \"Its just sore in my right thigh. Sometimes I feel something coming down like a electrical sensation, but the back doesn't hurt anymore and the leg just feels sore. \"    OBJECTIVE    34 min Therapeutic Exercise:  [x] See flow sheet :   Rationale: increase ROM and increase strength to improve the patients ability to improve ADL ease. 10 min Neuromuscular Re-education:  [x]  See flow sheet :   Rationale: increase strength and improve coordination  to improve the patients ability to improve ADL ease. With   [] TE   [] TA   [] neuro   [] other: Patient Education: [x] Review HEP    [] Progressed/Changed HEP based on:   [] positioning   [] body mechanics   [] transfers   [] heat/ice application    [] other:      Other Objective/Functional Measures:     (-) thigh thrust bilat    (+) Anjelica Bowden for limited mobility, but (-) SIJ pain provocation bilat     Requires cueing for all interventions for proper performance     Pain Level (0-10 scale) post treatment: 0    ASSESSMENT/Changes in Function: Pt demonstrates decreased SIJ irritation and improved hip mobility with some continued hip mobility limitations.  Reports no LBP at present, but reports continued paresthesias into R thigh, but no pain reported and unable to clearly reproduce or affect that sensation during treatment. Will continue progression into return to functional movement and lifting tasks as tolerated with plan to progress towards DC and updated HEP over next few visits. Patient will continue to benefit from skilled PT services to modify and progress therapeutic interventions, address functional mobility deficits, address ROM deficits, address strength deficits, analyze and cue movement patterns and analyze and modify body mechanics/ergonomics to attain remaining goals. []  See Plan of Care  []  See progress note/recertification  []  See Discharge Summary         Progress towards goals / Updated goals:  Short Term Goals: To be accomplished in 2 weeks:  1. Patient will report performance of HEP at least 2 times per day to facilitate improved outcomes and improved self management. Progressing per pt report 1/24/2017; Partial, 2-3x a week. 2/1/2017  Long Term Goals: To be accomplished in 4 weeks:  1. Patient will report FOTO score of 61 or better to indicate significant improvement in functional status. - FOTO 44%. 2/1/2017   2. Pt will demonstrate (-) SIMI and thigh thrust pain provocation to improve ease of daily activity. Met 2/6/2017  3. Pt will demonstrate R hip prone ER to 30 degrees or greater without pain provocation to improve ADL ease. - AROM (R) Hip ER in prone 28 degrees. 2/1/2017  4.  Pt will demonstrate ability to squat x 10 without pain to improve ease of transfers     PLAN  [x]  Upgrade activities as tolerated     [x]  Continue plan of care  []  Update interventions per flow sheet       []  Discharge due to:_  []  Other:_      Alena Hinojosa, PT, DPT, ATC, CSCS 2/6/2017  10:47 AM    Future Appointments  Date Time Provider Danni Soria   2/28/2017 9:15 AM Jose Callaway  [address]

## 2017-02-09 ENCOUNTER — TELEPHONE (OUTPATIENT)
Dept: ORTHOPEDIC SURGERY | Age: 57
End: 2017-02-09

## 2017-02-09 NOTE — TELEPHONE ENCOUNTER
Pt reports, lower back pain -10/10 pain scale. Lower back pain return last night. Pt taking gabapentin as directed. Please call. Thanh Yin Pt p#[phone]

## 2017-02-10 NOTE — TELEPHONE ENCOUNTER
Called patient, patient verified , informed patient of message below. Patient verbalized agreement/understanding. No further action required at this time.

## 2017-02-10 NOTE — TELEPHONE ENCOUNTER
Patient was last seen on 01/23/2017 by Dr. Cliffton Rubinstein. Follow up scheduled for 02/28/2017 with Dr. Cliffton Rubinstein. Patient was given a Toradol injection and started on Neurontin 300 mg 1 tab QAM and 2 tabs QHS. Please review and advise.

## 2017-02-16 ENCOUNTER — HOSPITAL ENCOUNTER (OUTPATIENT)
Dept: PHYSICAL THERAPY | Age: 57
Discharge: HOME | End: 2017-02-16
Payer: MEDICARE

## 2017-02-16 PROCEDURE — G8985 CARRY GOAL STATUS: HCPCS

## 2017-02-16 PROCEDURE — 97110 THERAPEUTIC EXERCISES: CPT

## 2017-02-16 PROCEDURE — G8986 CARRY D/C STATUS: HCPCS

## 2017-02-16 PROCEDURE — 97112 NEUROMUSCULAR REEDUCATION: CPT

## 2017-02-16 NOTE — PROGRESS NOTES
PT DAILY TREATMENT NOTE - [facility]     Patient Name:  Samples  Date:2017  : 1960  [x]  Patient  Verified  [place]or: [place] / Plan: [place] MED[place] PART A & B / Product Type: Medicare /    In time:2:00pm  Out time:2:35pm  Total Treatment Time (min): 35  Total Timed Codes (min): 35  1:1 Treatment Time ( only): 28   Visit #: 7 of 8    Treatment Area: Low back pain [M54.5]  Spondylosis without myelopathy or radiculopathy, lumbar region [M47.816]  Dorsalgia, unspecified [M54.9]    SUBJECTIVE  Pain Level (0-10 scale): 0  Any medication changes, allergies to medications, adverse drug reactions, diagnosis change, or new procedure performed?: [x] No    [] Yes (see summary sheet for update)  Subjective functional status/changes:   [] No changes reported  \"I've been feeling great. No pain. Just a little tight. \"    OBJECTIVE  25 min Therapeutic Exercise:  [x] See flow sheet : Verbal DC instruction, gradual exercise progression as tolerated. Rationale: increase ROM and increase strength to improve the patients ability to perform ADLs with ease and facilitate self management. 10 min Neuromuscular Re-education:  [x]  See flow sheet :   Rationale: increase strength and improve coordination  to improve the patients ability to improve ADL ease           With   [] TE   [] TA   [] neuro   [] other: Patient Education: [x] Review HEP    [] Progressed/Changed HEP based on:   [] positioning   [] body mechanics   [] transfers   [] heat/ice application    [] other:      Other Objective/Functional Measures: FOTO: 54    Demonstrates squat x 15 reps without pain provocation     Pain Level (0-10 scale) post treatment: 0    ASSESSMENT/Changes in Function: Pt has progressed well reporting full return to PLOF with no pain at this time. All therapeutic goals met or near met. DC from skilled care.     Patient will continue to benefit from skilled PT services to modify and progress therapeutic interventions, address functional mobility deficits, address ROM deficits, address strength deficits, analyze and address soft tissue restrictions, analyze and cue movement patterns and analyze and modify body mechanics/ergonomics to attain remaining goals. []  See Plan of Care  []  See progress note/recertification  []  See Discharge Summary         Progress towards goals / Updated goals:  Short Term Goals: To be accomplished in 2 weeks:  1. Patient will report performance of HEP at least 2 times per day to facilitate improved outcomes and improved self management. Progressing per pt report 1/24/2017; Partial, 2-3x a week. 2/1/2017  Long Term Goals: To be accomplished in 4 weeks:  1. Patient will report FOTO score of 61 or better to indicate significant improvement in functional status. - FOTO 44%. 2/1/2017 Near met 54 2/16/2017   2. Pt will demonstrate (-) SIMI and thigh thrust pain provocation to improve ease of daily activity. Met 2/6/2017  3. Pt will demonstrate R hip prone ER to 30 degrees or greater without pain provocation to improve ADL ease. - AROM (R) Hip ER in prone 28 degrees. 2/1/2017  4. Pt will demonstrate ability to squat x 10 without pain to improve ease of transfers.  Met, performed 15 without pain 2/16/2017    PLAN  []  Upgrade activities as tolerated     []  Continue plan of care  []  Update interventions per flow sheet       [x]  Discharge due to:_Goals met or near met, pt pain free  []  Other:_      Brenda Escobedo, PT, DPT, ATC, CSCS 2/16/2017  2:11 PM    Future Appointments  Date Time Provider Danni Soria   2/28/2017 9:15 AM Rosalba Alcantara  [address]

## 2017-02-16 NOTE — PROGRESS NOTES
[facility]  [address]  [place], [address]  [phone] fax    Physical Therapy Discharge Summary    Patient name: Obdulia Patel Start of Care: 2017   Referral source: Arthur Welsh MD : 1960   Medical/Treatment Diagnosis: Low back pain [M54.5]  Spondylosis without myelopathy or radiculopathy, lumbar region [M47.816]  Dorsalgia, unspecified [M54.9] Onset Date: 1 month prior to start of care     Prior Hospitalization: see medical history Provider#: [id number]   Medications: Verified on Patient Summary List    Comorbidities: L knee OA, diabetes, Arthritis, none other per pt report  Prior Level of Function: no LBP, walking for exercise, [facility] including parul without limitations d/t LBP  Visits from Start of Care: 7    Missed Visits: 0  Reporting Period : 2017 to 2017    Summary of Care:  Short Term Goals: To be accomplished in 2 weeks:  1. Patient will report performance of HEP at least 2 times per day to facilitate improved outcomes and improved self management. Progressing per pt report 2017; Partial, 2-3x a week. 2017  Long Term Goals: To be accomplished in 4 weeks:  1. Patient will report FOTO score of 61 or better to indicate significant improvement in functional status. - FOTO 44%. 2017 Near met 54 2017   2. Pt will demonstrate (-) SIMI and thigh thrust pain provocation to improve ease of daily activity. Met 2017  3. Pt will demonstrate R hip prone ER to 30 degrees or greater without pain provocation to improve ADL ease. - AROM (R) Hip ER in prone 28 degrees. 2017  4. Pt will demonstrate ability to squat x 10 without pain to improve ease of transfers.  Met, performed 15 without pain 2017    G-Codes (GP)    Carry    Goal  CJ= 20-39%   D/C  CK= 40-59%    The severity rating is based on clinical judgment and the FOTO score.    ASSESSMENT/RECOMMENDATIONS:  [x]Discontinue therapy: [x]Patient has reached or is progressing toward set goals      []Patient is non-compliant or has abdicated      []Due to lack of appreciable progress towards set goals    Isatu Masterson, PT, DPT, ATC, CSCS 2/16/2017 3:15 PM

## 2017-02-28 ENCOUNTER — TELEPHONE (OUTPATIENT)
Dept: ORTHOPEDIC SURGERY | Age: 57
End: 2017-02-28

## 2017-02-28 ENCOUNTER — OFFICE VISIT (OUTPATIENT)
Dept: ORTHOPEDIC SURGERY | Age: 57
End: 2017-02-28

## 2017-02-28 VITALS
RESPIRATION RATE: 18 BRPM | SYSTOLIC BLOOD PRESSURE: 120 MMHG | BODY MASS INDEX: 34.69 KG/M2 | DIASTOLIC BLOOD PRESSURE: 71 MMHG | OXYGEN SATURATION: 97 % | TEMPERATURE: 98 F | HEIGHT: 67 IN | HEART RATE: 62 BPM | WEIGHT: 221 LBS

## 2017-02-28 DIAGNOSIS — M79.2 NEURITIS: ICD-10-CM

## 2017-02-28 DIAGNOSIS — M47.816 LUMBAR FACET ARTHROPATHY: Primary | ICD-10-CM

## 2017-02-28 DIAGNOSIS — M62.838 MUSCLE SPASM: ICD-10-CM

## 2017-02-28 DIAGNOSIS — M54.41 ACUTE BACK PAIN WITH SCIATICA, RIGHT: ICD-10-CM

## 2017-02-28 PROBLEM — M54.40 ACUTE BACK PAIN WITH SCIATICA: Status: ACTIVE | Noted: 2017-02-28

## 2017-02-28 RX ORDER — DICLOFENAC SODIUM 75 MG/1
75 TABLET, DELAYED RELEASE ORAL 2 TIMES DAILY
Qty: 60 TAB | Refills: 1 | Status: SHIPPED | OUTPATIENT
Start: 2017-02-28 | End: 2017-03-21 | Stop reason: SDUPTHER

## 2017-02-28 RX ORDER — METHOCARBAMOL 750 MG/1
750 TABLET, FILM COATED ORAL
Qty: 60 TAB | Refills: 2 | Status: SHIPPED | OUTPATIENT
Start: 2017-02-28 | End: 2018-01-10

## 2017-02-28 RX ORDER — DICLOFENAC SODIUM 75 MG/1
75 TABLET, DELAYED RELEASE ORAL 2 TIMES DAILY
Qty: 60 TAB | Refills: 1 | Status: SHIPPED | OUTPATIENT
Start: 2017-02-28 | End: 2017-02-28 | Stop reason: SDUPTHER

## 2017-02-28 RX ORDER — METHOCARBAMOL 750 MG/1
750 TABLET, FILM COATED ORAL
Qty: 60 TAB | Refills: 2 | Status: SHIPPED | OUTPATIENT
Start: 2017-02-28 | End: 2017-02-28 | Stop reason: SDUPTHER
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
## 2017-02-28 NOTE — PATIENT INSTRUCTIONS
Low Back Arthritis: Exercises  Your Care Instructions  Here are some examples of typical rehabilitation exercises for your condition. Start each exercise slowly. Ease off the exercise if you start to have pain. Your doctor or physical therapist will tell you when you can start these exercises and which ones will work best for you. When you are not being active, find a comfortable position for rest. Some people are comfortable on the floor or a medium-firm bed with a small pillow under their head and another under their knees. Some people prefer to lie on their side with a pillow between their knees. Don't stay in one position for too long. Take short walks (10 to 20 minutes) every 2 to 3 hours. Avoid slopes, hills, and stairs until you feel better. Walk only distances you can manage without pain, especially leg pain. How to do the exercises  Pelvic tilt    1. Lie on your back with your knees bent. 2. \"Brace\" your stomach--tighten your muscles by pulling in and imagining your belly button moving toward your spine. 3. Press your lower back into the floor. You should feel your hips and pelvis rock back. 4. Hold for 6 seconds while breathing smoothly. 5. Relax and allow your pelvis and hips to rock forward. 6. Repeat 8 to 12 times. Back stretches    1. Get down on your hands and knees on the floor. 2. Relax your head and allow it to droop. Round your back up toward the ceiling until you feel a nice stretch in your upper, middle, and lower back. Hold this stretch for as long as it feels comfortable, or about 15 to 30 seconds. 3. Return to the starting position with a flat back while you are on your hands and knees. 4. Let your back sway by pressing your stomach toward the floor. Lift your buttocks toward the ceiling. 5. Hold this position for 15 to 30 seconds. 6. Repeat 2 to 4 times. Follow-up care is a key part of your treatment and safety.  Be sure to make and go to all appointments, and call your doctor if you are having problems. It's also a good idea to know your test results and keep a list of the medicines you take. Where can you learn more? Go to http://papa-kathy.info/. Enter F243 in the search box to learn more about \"Low Back Arthritis: Exercises. \"  Current as of: May 23, 2016  Content Version: 11.1  © 4304-6934 [employer]. Care instructions adapted under license by [employer] (which disclaims liability or warranty for this information). If you have questions about a medical condition or this instruction, always ask your healthcare professional. [place] [address] any warranty or liability for your use of this information.

## 2017-02-28 NOTE — TELEPHONE ENCOUNTER
PATIENT CALLED FOR DR. Janelle Freitas. PATIENT SAID DR. MORAN PRESCRIBE HER SOME MEDICATION TODAY AND WHEN SHE WENT TO [facility] THEY WANTED TO CHARGE HER $34.00. PATIENT SAID SHE DID NOT HAVE THE MONEY TO PAY FOR THE MEDICATION BECAUSE HER INSURANCE MEDICARE AND MEDICAID USUALLY PAY FOR THE MEDICATIONS. PATIENT DID NOT GET THE MEDICATION. PATIENT IS ASKING FOR A CALL BACK AT   TEL. [phone]. [facility] TEL. [phone].

## 2017-02-28 NOTE — TELEPHONE ENCOUNTER
Spoke with patient, patient verified , informed patient if she uses the Good Rx card in our office with coupon, Diclofenac would cost 4.00 and Methocarbamol would cost 15.13 at [facility]. Total 19.13. Patient verbalized understanding. Per patient, she would like for both Rx's to be printed and she will  both the coupon and the Rx's on Friday. No further action required at this time.

## 2017-03-10 ENCOUNTER — HOSPITAL ENCOUNTER (OUTPATIENT)
Age: 57
Discharge: HOME | End: 2017-03-10
Attending: PHYSICAL MEDICINE & REHABILITATION
Payer: MEDICARE

## 2017-03-10 DIAGNOSIS — M79.2 NEURITIS: ICD-10-CM

## 2017-03-10 DIAGNOSIS — M47.816 LUMBAR FACET ARTHROPATHY: ICD-10-CM

## 2017-03-10 DIAGNOSIS — M62.838 MUSCLE SPASM: ICD-10-CM

## 2017-03-10 DIAGNOSIS — M54.41 ACUTE BACK PAIN WITH SCIATICA, RIGHT: ICD-10-CM

## 2017-03-10 PROCEDURE — 72148 MRI LUMBAR SPINE W/O DYE: CPT

## 2017-03-21 ENCOUNTER — OFFICE VISIT (OUTPATIENT)
Dept: ORTHOPEDIC SURGERY | Age: 57
End: 2017-03-21

## 2017-03-21 VITALS
WEIGHT: 224 LBS | TEMPERATURE: 98.5 F | SYSTOLIC BLOOD PRESSURE: 115 MMHG | OXYGEN SATURATION: 96 % | DIASTOLIC BLOOD PRESSURE: 67 MMHG | RESPIRATION RATE: 18 BRPM | BODY MASS INDEX: 35.08 KG/M2 | HEART RATE: 72 BPM

## 2017-03-21 DIAGNOSIS — M51.26 HNP (HERNIATED NUCLEUS PULPOSUS), LUMBAR: ICD-10-CM

## 2017-03-21 DIAGNOSIS — M79.2 NEURITIS: ICD-10-CM

## 2017-03-21 DIAGNOSIS — M47.816 LUMBAR FACET ARTHROPATHY: Primary | ICD-10-CM

## 2017-03-21 DIAGNOSIS — M62.830 MUSCLE SPASM OF BACK: ICD-10-CM

## 2017-03-21 RX ORDER — GABAPENTIN 300 MG/1
CAPSULE ORAL
Qty: 90 CAP | Refills: 1 | Status: SHIPPED | OUTPATIENT
Start: 2017-03-21 | End: 2017-05-31 | Stop reason: SDUPTHER

## 2017-03-21 RX ORDER — DICLOFENAC SODIUM 75 MG/1
75 TABLET, DELAYED RELEASE ORAL 2 TIMES DAILY
Qty: 60 TAB | Refills: 1 | Status: SHIPPED | OUTPATIENT
Start: 2017-03-21 | End: 2017-05-31 | Stop reason: SDUPTHER

## 2017-03-21 RX ORDER — TIZANIDINE 4 MG/1
TABLET ORAL
Qty: 45 TAB | Refills: 1 | Status: SHIPPED | OUTPATIENT
Start: 2017-03-21 | End: 2017-05-31 | Stop reason: SDUPTHER

## 2017-03-21 NOTE — PROGRESS NOTES
[facility]  [address] [place], [facility] [address]  Phone: [phone]  Fax: [phone]          HISTORY OF PRESENT ILLNESS:  Governor Vick is a 61 y.o. female with history of lumbar pain. She c/o pain in the lower back and right thigh. Pt admits that she has experienced some improvement in her right thigh pain and states that her thigh is no longer as tender to touch. She denies any weakness in the right leg at this time. 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 She states that her insurance did not cover Robaxin 750 mg (requiring her to pay $35 for the medication) and she has experienced sedation when taking Flexeril in the past. Pt admits to some relief when taking Voltaren 75 mg as needed. Pt at this time desires to proceed with medication evaluation. Pain Scale: 7/10    PCP: Latha Box MD       Past Medical History:   Diagnosis Date    ADHD (attention deficit hyperactivity disorder)     Psychiatrist Dr. Maykel Richardson: Gemma Richardson: Maura Tan    Arthritis     Back pain     Diabetes [facility])     pt states \"pre-diabetes\"   Sergey Marvin     Dermatologist Dr. Reta Lambert Left knee pain 12/09/2015    Dr. David Ruiz History     Social History    Marital status: LEGALLY      Spouse name: N/A    Number of children: N/A    Years of education: N/A     Occupational History    Not on file.      Social History Main Topics    Smoking status: Former Smoker    Smokeless tobacco: Not on file    Alcohol use No    Drug use: No    Sexual activity: Not on file     Other Topics Concern    Not on file     Social History Narrative       Current Outpatient Prescriptions   Medication Sig Dispense Refill    tiZANidine (ZANAFLEX) 4 mg tablet 1 po bid prn muscle spasm 45 Tab 1    diclofenac EC (VOLTAREN) 75 mg EC tablet Take 1 Tab by mouth two (2) times a day. With food. 60 Tab 1    gabapentin (NEURONTIN) 300 mg capsule Take 1 po q am and 2 po q pm as directed 90 Cap 1    ALPRAZolam (XANAX) 0.5 mg tablet Take 1 mg by mouth nightly as needed for Anxiety or Sleep. 1/1/17, QTY#30, 30 day supply. Dr. Garrett Beatty  0    dextroamphetamine-amphetamine (ADDERALL) 10 mg tablet Take 1 Tab by mouth two (2) times a day. 10/26/16, QTY#60, 30 days, Dr. Garrett Beatty  0    metFORMIN (GLUCOPHAGE) 500 mg tablet Take 500 mg by mouth daily (with breakfast).  methocarbamol (ROBAXIN) 750 mg tablet Take 1 Tab by mouth two (2) times daily as needed. 60 Tab 2    dextroamphetamine-amphetamine (ADDERALL) 20 mg tablet 1/2/17, QTY#45, 30 days.  hydrALAZINE (APRESOLINE) 25 mg tablet 11/21/16, QTY#30, 10 days. Ms. Karimi Dilling. 0    triamcinolone acetonide (KENALOG) 0.1 % ointment 10/25/16, QTY#454 g, 30 days. Dr. Martin Lindo  0    predniSONE (DELTASONE) 20 mg tablet Begin taking tomorrow Saturday 1/7/17 as you received your initial dose while in the ER. 3 tabs po every day x 2 days, then 2 tabs po every day x 3 days, then 1 tab po every day x 3 days. 15 Tab 0    HYDROcodone-acetaminophen (NORCO) 5-325 mg per tablet Take 1 Tab by mouth every four (4) hours as needed (BREAKTHROUGH PAIN ONLY, DO NOT FILL UNLESS PREDNISONE, ROBAXIN IS FILLED.). Max Daily Amount: 6 Tabs. 20 Tab 0       No Known Allergies      REVIEW OF SYSTEMS    Constitutional: Negative for fever, chills, or weight change. Respiratory: Negative for cough or shortness of breath. Cardiovascular: Negative for chest pain or palpitations. Gastrointestinal: Negative for acid reflux, change in bowel habits, or constipation. Genitourinary: Negative for dysuria and flank pain. Musculoskeletal: Positive for lumbar pain. Skin: Negative for rash.    Neurological: Negative for headaches, dizziness, or numbness. Endo/Heme/Allergies: Negative for increased bruising. Psychiatric/Behavioral: Negative for difficulty with sleep. PHYSICAL EXAMINATION  Visit Vitals    /67    Pulse 72    Temp 98.5 °F (36.9 °C) (Oral)    Resp 18    Wt 224 lb (101.6 kg)    SpO2 96%    BMI 35.08 kg/m2       Constitutional: Awake, alert, and in no acute distress  Neurological: 1+ symmetrical DTRs in the lower extremities. Sensation to light touch is intact. Skin: warm, dry, and intact. Musculoskeletal: Tenderness to palpation in the lower lumbar region. Pain with extension, axial loading, and forward flexion. No pain with internal or external rotation of her hips. Mildly positive straight leg raise on the right. Hip Flex  Quads Hamstrings Ankle DF EHL Ankle PF   Right +4/5 +4/5 +4/5 +4/5 +4/5 +4/5   Left +4/5 +4/5 +4/5 +4/5 +4/5 +4/5     IMAGING:    Lumbar Spine MRI from 03/10/2017 was personally reviewed with the Pt and demonstrated:    Results from [place] encounter on 03/10/17   MRI LUMB SPINE WO CONT   Narrative Sagittal and axial multisequence MR images of lumbar spine were obtained. Normal alignment. No compression fracture or pathologic marrow signal.  Hemangioma in L3. Conus medullaris ends at L1-L2 with normal morphology and  signal intensity. T11-T12: Posterior disc bulge with posterior lateral corner disc protrusion,  slightly more focal on the left. Facet and ligamentous hypertrophy. Mild central  stenosis. Moderate left and mild right foraminal stenosis. T12-L1, L1-L2: No disc herniation, central or foraminal stenosis. L2-L3: Posterior disc bulge with mild right posterior lateral corner disc  protrusion. No central stenosis. Facet and ligamentous hypertrophy. Moderate  right foraminal stenosis with mild compression of right exiting L2 nerve root  suspected. L3-L4: Mild posterior disc bulge. Mild facet and ligamentous hypertrophy. No  significant foraminal stenosis.  No central stenosis. L4-L5: Mild posterior disc bulge with no central stenosis. Facet and ligamentous  hypertrophy with no significant foraminal stenosis. L5-S1: Minimal posterior disc bulge with no central stenosis. Facet and  ligamentous hypertrophy with no significant foraminal stenosis. Impression IMPRESSION:  1. L2-L3: Disc disease with right foraminal stenosis and potential mild  compression of right exiting L2 nerve root. 2. Moderate left foraminal stenosis at T11-T12 with posterior lateral corner  disc protrusion and facet hypertrophy. ASSESSMENT   Radha Spaulding was seen today for back pain. Diagnoses and all orders for this visit:    Lumbar facet arthropathy (HCC)  -     diclofenac EC (VOLTAREN) 75 mg EC tablet; Take 1 Tab by mouth two (2) times a day. With food. HNP (herniated nucleus pulposus), lumbar  -     gabapentin (NEURONTIN) 300 mg capsule; Take 1 po q am and 2 po q pm as directed    Neuritis  -     gabapentin (NEURONTIN) 300 mg capsule; Take 1 po q am and 2 po q pm as directed    Muscle spasm of back  -     tiZANidine (ZANAFLEX) 4 mg tablet; 1 po bid prn muscle spasm         IMPRESSION AND PLAN:  Addison Connelly is a 61 y.o. female with history of lumbar pain. She c/o pain in the lower back and right thigh. 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 She states that her insurance did not cover Robaxin 750 mg.     1) Pt was given information on lumbar arthritis exercises. 2) She was prescribed Zanaflex 4 mg 1 tab BID prn muscle spasm in place of Robaxin because of her insurance coverage  3) Pt received a refill of Voltaren 75 mg 1 tab BID with food. 4) She also received a refill of Neurontin 300 mg 1 tab QAM and 2 tabs QHS. 5) Ms. Hanna Valdez has a reminder for a \"due or due soon\" health maintenance.  I have asked that she contact her primary care provider, Josse Cabrera MD, for follow-up on this health maintenance. 6)  reviewed. 7) Pt will follow-up in 2 months.       Written by Reid Briggs, as dictated by Nikki Tellez MD.

## 2017-03-21 NOTE — MR AVS SNAPSHOT
Visit Information Date & Time Provider Department Dept. Phone Encounter #  
 3/21/2017 10:50 AM Zonia Posey MD [facility] ONE [phone] Follow-up Instructions Return in about 2 months (around 5/21/2017) for Medication follow up. Your Appointments 5/11/2017  9:50 AM  
Follow Up with Zonia Posey MD  
[facility] ONE [facility]) Appt Note: 2 month back fu $1.00  
 [address] [facility] [id number] [phone]  
  
   
 [address] [facility] [address] Upcoming Health Maintenance Date Due Hepatitis C Screening 1960 HEMOGLOBIN A1C Q6M 1960 FOOT EXAM Q1 1/28/1969 MICROALBUMIN Q1 1/28/1969 EYE EXAM RETINAL OR DILATED Q1 1/28/1969 Pneumococcal 19-64 Medium Risk (1 of 1 - PPSV23) 1/28/1978 DTaP/Tdap/Td series (1 - Tdap) 1/28/1980 PAP AKA CERVICAL CYTOLOGY 1/28/1980 FOBT Q 1 YEAR AGE 50-75 1/28/2009 LIPID PANEL Q1 6/21/2013 INFLUENZA AGE 9 TO ADULT 8/1/2016 BREAST CANCER SCRN MAMMOGRAM 7/26/2018 Allergies as of 3/21/2017  Review Complete On: 3/21/2017 By: Zonia Posey MD  
 No Known Allergies Current Immunizations  Never Reviewed No immunizations on file. Not reviewed this visit You Were Diagnosed With   
  
 Codes Comments Lumbar facet arthropathy (HCC)    -  Primary ICD-10-CM: M12.88 ICD-9-CM: 721.3 HNP (herniated nucleus pulposus), lumbar     ICD-10-CM: M51.26 
ICD-9-CM: 722.10 Neuritis     ICD-10-CM: M79.2 ICD-9-CM: 729.2 Muscle spasm of back     ICD-10-CM: D09.944 ICD-9-CM: 724.8 Vitals BP Pulse Temp Resp Weight(growth percentile) SpO2  
 115/67 72 98.5 °F (36.9 °C) (Oral) 18 224 lb (101.6 kg) 96% BMI OB Status Smoking Status 35.08 kg/m2 Hysterectomy Former Smoker BMI and BSA Data Body Mass Index Body Surface Area 35.08 kg/m 2 2.19 m 2 Preferred Pharmacy Pharmacy Name Phone [facility] [address], [address] [facility] [phone] Your Updated Medication List  
  
   
This list is accurate as of: 3/21/17 12:23 PM.  Always use your most recent med list.  
  
  
  
  
 ALPRAZolam 0.5 mg tablet Commonly known as:  Jonathan [place] Take 1 mg by mouth nightly as needed for Anxiety or Sleep. 1/1/17, QTY#30, 30 day supply. Dr. Leopoldo Court * dextroamphetamine-amphetamine 10 mg tablet Commonly known as:  ADDERALL Take 1 Tab by mouth two (2) times a day. 10/26/16, QTY#60, 30 days, Dr. Leopoldo Court * dextroamphetamine-amphetamine 20 mg tablet Commonly known as:  ADDERALL  
1/2/17, QTY#45, 30 days. diclofenac EC 75 mg EC tablet Commonly known as:  VOLTAREN Take 1 Tab by mouth two (2) times a day. With food. gabapentin 300 mg capsule Commonly known as:  NEURONTIN Take 1 po q am and 2 po q pm as directed  
  
 hydrALAZINE 25 mg tablet Commonly known as:  APRESOLINE  
11/21/16, QTY#30, 10 days. Ms. Maria Elena Douglas. HYDROcodone-acetaminophen 5-325 mg per tablet Commonly known as:  Rosalind Fothergill Take 1 Tab by mouth every four (4) hours as needed (BREAKTHROUGH PAIN ONLY, DO NOT FILL UNLESS PREDNISONE, ROBAXIN IS FILLED.). Max Daily Amount: 6 Tabs. metFORMIN 500 mg tablet Commonly known as:  GLUCOPHAGE Take 500 mg by mouth daily (with breakfast). methocarbamol 750 mg tablet Commonly known as:  ROBAXIN Take 1 Tab by mouth two (2) times daily as needed. predniSONE 20 mg tablet Commonly known as:  Klein Fossa Begin taking tomorrow Saturday 1/7/17 as you received your initial dose while in the ER. 3 tabs po every day x 2 days, then 2 tabs po every day x 3 days, then 1 tab po every day x 3 days. tiZANidine 4 mg tablet Commonly known as:  Niurka Grew 1 po bid prn muscle spasm triamcinolone acetonide 0.1 % ointment Commonly known as:  KENALOG  
10/25/16, QTY#454 g, 30 days. Dr. Isai Ruiz * Notice: This list has 2 medication(s) that are the same as other medications prescribed for you. Read the directions carefully, and ask your doctor or other care provider to review them with you. Prescriptions Printed Refills  
 diclofenac EC (VOLTAREN) 75 mg EC tablet 1 Sig: Take 1 Tab by mouth two (2) times a day. With food. Class: Print Route: Oral  
  
Prescriptions Sent to Pharmacy Refills  
 tiZANidine (ZANAFLEX) 4 mg tablet 1 Si po bid prn muscle spasm Class: Normal  
 [facility]: [id number] [address] Ph #: [phone]  
 gabapentin (NEURONTIN) 300 mg capsule 1 Sig: Take 1 po q am and 2 po q pm as directed Class: Normal  
 [facility] [facility], [facility] Ph #: [phone] Follow-up Instructions Return in about 2 months (around 2017) for Medication follow up. Patient Instructions Low Back Arthritis: Exercises Your Care Instructions Here are some examples of typical rehabilitation exercises for your condition. Start each exercise slowly. Ease off the exercise if you start to have pain. Your doctor or physical therapist will tell you when you can start these exercises and which ones will work best for you. When you are not being active, find a comfortable position for rest. Some people are comfortable on the floor or a medium-firm bed with a small pillow under their head and another under their knees. Some people prefer to lie on their side with a pillow between their knees. Don't stay in one position for too long. Take short walks (10 to 20 minutes) every 2 to 3 hours. Avoid slopes, hills, and stairs until you feel better. Walk only distances you can manage without pain, especially leg pain. How to do the exercises Pelvic tilt 1. Lie on your back with your knees bent. 2. \"Brace\" your stomachtighten your muscles by pulling in and imagining your belly button moving toward your spine. 3. Press your lower back into the floor. You should feel your hips and pelvis rock back. 4. Hold for 6 seconds while breathing smoothly. 5. Relax and allow your pelvis and hips to rock forward. 6. Repeat 8 to 12 times. Back stretches 1. Get down on your hands and knees on the floor. 2. Relax your head and allow it to droop. Round your back up toward the ceiling until you feel a nice stretch in your upper, middle, and lower back. Hold this stretch for as long as it feels comfortable, or about 15 to 30 seconds. 3. Return to the starting position with a flat back while you are on your hands and knees. 4. Let your back sway by pressing your stomach toward the floor. Lift your buttocks toward the ceiling. 5. Hold this position for 15 to 30 seconds. 6. Repeat 2 to 4 times. Follow-up care is a key part of your treatment and safety. Be sure to make and go to all appointments, and call your doctor if you are having problems. It's also a good idea to know your test results and keep a list of the medicines you take. Where can you learn more? Go to http://papa-kathy.info/. Enter A591 in the search box to learn more about \"Low Back Arthritis: Exercises. \" Current as of: May 23, 2016 Content Version: 11.1 © 8250-6026 [employer]. Care instructions adapted under license by [employer] (which disclaims liability or warranty for this information). If you have questions about a medical condition or this instruction, always ask your healthcare professional. [place] [address] any warranty or liability for your use of this information. Introducing [facility]!    
 Tiffanie Fuller introduces [employer]) patient portal. Now you can access parts of your medical record, email your doctor's office, and request medication refills online. 1. In your internet browser, go to [email or web address] 2. Click on the First Time User? Click Here link in the Sign In box. You will see the New Member Sign Up page. 3. Enter your [employer] Access Code exactly as it appears below. You will not need to use this code after youve completed the sign-up process. If you do not sign up before the expiration date, you must request a new code. · [employer] Access Code: [email or web address] Expires: 4/6/2017 12:55 PM 
 
4. Enter the last four digits of your Social Security Number (xxxx) and Date of Birth (mm/dd/yyyy) as indicated and click Submit. You will be taken to the next sign-up page. 5. Create a [employer] ID. This will be your [employer] login ID and cannot be changed, so think of one that is secure and easy to remember. 6. Create a [employer] password. You can change your password at any time. 7. Enter your Password Reset Question and Answer. This can be used at a later time if you forget your password. 8. Enter your e-mail address. You will receive e-mail notification when new information is available in [address] 9. Click Sign Up. You can now view and download portions of your medical record. 10. Click the Download Summary menu link to download a portable copy of your medical information. If you have questions, please visit the Frequently Asked Questions section of the [employer] website. Remember, [employer] is NOT to be used for urgent needs. For medical emergencies, dial 911. Now available from your iPhone and Android! Please provide this summary of care documentation to your next provider. Your primary care clinician is listed as Josse Cabrera. If you have any questions after today's visit, please call [phone].

## 2017-03-21 NOTE — PATIENT INSTRUCTIONS
Low Back Arthritis: Exercises  Your Care Instructions  Here are some examples of typical rehabilitation exercises for your condition. Start each exercise slowly. Ease off the exercise if you start to have pain. Your doctor or physical therapist will tell you when you can start these exercises and which ones will work best for you. When you are not being active, find a comfortable position for rest. Some people are comfortable on the floor or a medium-firm bed with a small pillow under their head and another under their knees. Some people prefer to lie on their side with a pillow between their knees. Don't stay in one position for too long. Take short walks (10 to 20 minutes) every 2 to 3 hours. Avoid slopes, hills, and stairs until you feel better. Walk only distances you can manage without pain, especially leg pain. How to do the exercises  Pelvic tilt    1. Lie on your back with your knees bent. 2. \"Brace\" your stomach--tighten your muscles by pulling in and imagining your belly button moving toward your spine. 3. Press your lower back into the floor. You should feel your hips and pelvis rock back. 4. Hold for 6 seconds while breathing smoothly. 5. Relax and allow your pelvis and hips to rock forward. 6. Repeat 8 to 12 times. Back stretches    1. Get down on your hands and knees on the floor. 2. Relax your head and allow it to droop. Round your back up toward the ceiling until you feel a nice stretch in your upper, middle, and lower back. Hold this stretch for as long as it feels comfortable, or about 15 to 30 seconds. 3. Return to the starting position with a flat back while you are on your hands and knees. 4. Let your back sway by pressing your stomach toward the floor. Lift your buttocks toward the ceiling. 5. Hold this position for 15 to 30 seconds. 6. Repeat 2 to 4 times. Follow-up care is a key part of your treatment and safety.  Be sure to make and go to all appointments, and call your doctor if you are having problems. It's also a good idea to know your test results and keep a list of the medicines you take. Where can you learn more? Go to http://papa-kathy.info/. Enter O451 in the search box to learn more about \"Low Back Arthritis: Exercises. \"  Current as of: May 23, 2016  Content Version: 11.1  © 0666-1461 [employer]. Care instructions adapted under license by [employer] (which disclaims liability or warranty for this information). If you have questions about a medical condition or this instruction, always ask your healthcare professional. [address] any warranty or liability for your use of this information.

## 2017-05-31 ENCOUNTER — TELEPHONE (OUTPATIENT)
Dept: ORTHOPEDIC SURGERY | Age: 57
End: 2017-05-31

## 2017-05-31 ENCOUNTER — OFFICE VISIT (OUTPATIENT)
Dept: ORTHOPEDIC SURGERY | Age: 57
End: 2017-05-31

## 2017-05-31 VITALS
TEMPERATURE: 98.3 F | BODY MASS INDEX: 35.9 KG/M2 | SYSTOLIC BLOOD PRESSURE: 128 MMHG | WEIGHT: 223.4 LBS | HEIGHT: 66 IN | HEART RATE: 65 BPM | DIASTOLIC BLOOD PRESSURE: 79 MMHG | RESPIRATION RATE: 16 BRPM

## 2017-05-31 DIAGNOSIS — R26.9 GAIT ABNORMALITY: ICD-10-CM

## 2017-05-31 DIAGNOSIS — M47.816 LUMBAR FACET ARTHROPATHY: ICD-10-CM

## 2017-05-31 DIAGNOSIS — M51.26 HNP (HERNIATED NUCLEUS PULPOSUS), LUMBAR: ICD-10-CM

## 2017-05-31 DIAGNOSIS — M79.2 NEURITIS: ICD-10-CM

## 2017-05-31 DIAGNOSIS — M17.12 OSTEOARTHRITIS OF LEFT KNEE, UNSPECIFIED OSTEOARTHRITIS TYPE: Primary | ICD-10-CM

## 2017-05-31 DIAGNOSIS — M62.830 MUSCLE SPASM OF BACK: ICD-10-CM

## 2017-05-31 RX ORDER — KETOROLAC TROMETHAMINE 15 MG/ML
60 INJECTION, SOLUTION INTRAMUSCULAR; INTRAVENOUS ONCE
Qty: 1 VIAL | Refills: 0
Start: 2017-05-31 | End: 2017-05-31

## 2017-05-31 RX ORDER — TIZANIDINE 4 MG/1
TABLET ORAL
Qty: 45 TAB | Refills: 1 | Status: SHIPPED | OUTPATIENT
Start: 2017-05-31 | End: 2018-01-10

## 2017-05-31 RX ORDER — DICLOFENAC SODIUM 75 MG/1
75 TABLET, DELAYED RELEASE ORAL 2 TIMES DAILY
Qty: 60 TAB | Refills: 1 | Status: SHIPPED | OUTPATIENT
Start: 2017-05-31 | End: 2018-01-10

## 2017-05-31 RX ORDER — GABAPENTIN 300 MG/1
CAPSULE ORAL
Qty: 90 CAP | Refills: 1 | Status: SHIPPED | OUTPATIENT
Start: 2017-05-31 | End: 2018-01-10

## 2017-05-31 NOTE — PATIENT INSTRUCTIONS
Low Back Pain: Exercises  Your Care Instructions  Here are some examples of typical rehabilitation exercises for your condition. Start each exercise slowly. Ease off the exercise if you start to have pain. Your doctor or physical therapist will tell you when you can start these exercises and which ones will work best for you. How to do the exercises  Press-up    1. Lie on your stomach, supporting your body with your forearms. 2. Press your elbows down into the floor to raise your upper back. As you do this, relax your stomach muscles and allow your back to arch without using your back muscles. As your press up, do not let your hips or pelvis come off the floor. 3. Hold for 15 to 30 seconds, then relax. 4. Repeat 2 to 4 times. Alternate arm and leg (bird dog) exercise    Note: Do this exercise slowly. Try to keep your body straight at all times, and do not let one hip drop lower than the other. 1. Start on the floor, on your hands and knees. 2. Tighten your belly muscles. 3. Raise one leg off the floor, and hold it straight out behind you. Be careful not to let your hip drop down, because that will twist your trunk. 4. Hold for about 6 seconds, then lower your leg and switch to the other leg. 5. Repeat 8 to 12 times on each leg. 6. Over time, work up to holding for 10 to 30 seconds each time. 7. If you feel stable and secure with your leg raised, try raising the opposite arm straight out in front of you at the same time. Knee-to-chest exercise    1. Lie on your back with your knees bent and your feet flat on the floor. 2. Bring one knee to your chest, keeping the other foot flat on the floor (or keeping the other leg straight, whichever feels better on your lower back). 3. Keep your lower back pressed to the floor. Hold for at least 15 to 30 seconds. 4. Relax, and lower the knee to the starting position. 5. Repeat with the other leg. Repeat 2 to 4 times with each leg.   6. To get more stretch, put your other leg flat on the floor while pulling your knee to your chest.  Curl-ups    1. Lie on the floor on your back with your knees bent at a 90-degree angle. Your feet should be flat on the floor, about 12 inches from your buttocks. 2. Cross your arms over your chest. If this bothers your neck, try putting your hands behind your neck (not your head), with your elbows spread apart. 3. Slowly tighten your belly muscles and raise your shoulder blades off the floor. 4. Keep your head in line with your body, and do not press your chin to your chest.  5. Hold this position for 1 or 2 seconds, then slowly lower yourself back down to the floor. 6. Repeat 8 to 12 times. Pelvic tilt exercise    1. Lie on your back with your knees bent. 2. \"Brace\" your stomach. This means to tighten your muscles by pulling in and imagining your belly button moving toward your spine. You should feel like your back is pressing to the floor and your hips and pelvis are rocking back. 3. Hold for about 6 seconds while you breathe smoothly. 4. Repeat 8 to 12 times. Heel dig bridging    1. Lie on your back with both knees bent and your ankles bent so that only your heels are digging into the floor. Your knees should be bent about 90 degrees. 2. Then push your heels into the floor, squeeze your buttocks, and lift your hips off the floor until your shoulders, hips, and knees are all in a straight line. 3. Hold for about 6 seconds as you continue to breathe normally, and then slowly lower your hips back down to the floor and rest for up to 10 seconds. 4. Do 8 to 12 repetitions. Hamstring stretch in doorway    1. Lie on your back in a doorway, with one leg through the open door. 2. Slide your leg up the wall to straighten your knee. You should feel a gentle stretch down the back of your leg. 3. Hold the stretch for at least 15 to 30 seconds. Do not arch your back, point your toes, or bend either knee.  Keep one heel touching the floor and the other heel touching the wall. 4. Repeat with your other leg. 5. Do 2 to 4 times for each leg. Hip flexor stretch    1. Kneel on the floor with one knee bent and one leg behind you. Place your forward knee over your foot. Keep your other knee touching the floor. 2. Slowly push your hips forward until you feel a stretch in the upper thigh of your rear leg. 3. Hold the stretch for at least 15 to 30 seconds. Repeat with your other leg. 4. Do 2 to 4 times on each side. Wall sit    1. Stand with your back 10 to 12 inches away from a wall. 2. Lean into the wall until your back is flat against it. 3. Slowly slide down until your knees are slightly bent, pressing your lower back into the wall. 4. Hold for about 6 seconds, then slide back up the wall. 5. Repeat 8 to 12 times. Follow-up care is a key part of your treatment and safety. Be sure to make and go to all appointments, and call your doctor if you are having problems. It's also a good idea to know your test results and keep a list of the medicines you take. Where can you learn more? Go to http://papa-kathy.info/. Enter C038 in the search box to learn more about \"Low Back Pain: Exercises. \"  Current as of: May 23, 2016  Content Version: 11.2  © 2909-9958 [employer]. Care instructions adapted under license by [employer] (which disclaims liability or warranty for this information). If you have questions about a medical condition or this instruction, always ask your healthcare professional. [place] [address] any warranty or liability for your use of this information. Knee Arthritis: Exercises  Your Care Instructions  Here are some examples of exercises for knee arthritis. Start each exercise slowly. Ease off the exercise if you start to have pain. Your doctor or physical therapist will tell you when you can start these exercises and which ones will work best for you.   How to do the exercises  Knee flexion with heel slide    5. Lie on your back with your knees bent. 6. Slide your heel back by bending your affected knee as far as you can. Then hook your other foot around your ankle to help pull your heel even farther back. 7. Hold for about 6 seconds, then rest for up to 10 seconds. 8. Repeat 8 to 12 times. 9. Switch legs and repeat steps 1 through 4, even if only one knee is sore. Quad sets    8. Sit with your affected leg straight and supported on the floor or a firm bed. Place a small, rolled-up towel under your knee. Your other leg should be bent, with that foot flat on the floor. 9. Tighten the thigh muscles of your affected leg by pressing the back of your knee down into the towel. 10. Hold for about 6 seconds, then rest for up to 10 seconds. 11. Repeat 8 to 12 times. 12. Switch legs and repeat steps 1 through 4, even if only one knee is sore. Straight-leg raises to the front    7. Lie on your back with your good knee bent so that your foot rests flat on the floor. Your affected leg should be straight. Make sure that your low back has a normal curve. You should be able to slip your hand in between the floor and the small of your back, with your palm touching the floor and your back touching the back of your hand. 8. Tighten the thigh muscles in your affected leg by pressing the back of your knee flat down to the floor. Hold your knee straight. 9. Keeping the thigh muscles tight and your leg straight, lift your affected leg up so that your heel is about 12 inches off the floor. Hold for about 6 seconds, then lower slowly. 10. Relax for up to 10 seconds between repetitions. 11. Repeat 8 to 12 times. 12. Switch legs and repeat steps 1 through 5, even if only one knee is sore. Active knee flexion    7. Lie on your stomach with your knees straight. If your kneecap is uncomfortable, roll up a washcloth and put it under your leg just above your kneecap.   8. Lift the foot of your affected leg by bending the knee so that you bring the foot up toward your buttock. If this motion hurts, try it without bending your knee quite as far. This may help you avoid any painful motion. 9. Slowly move your leg up and down. 10. Repeat 8 to 12 times. 11. Switch legs and repeat steps 1 through 4, even if only one knee is sore. Quadriceps stretch (facedown)    5. Lie flat on your stomach, and rest your face on the floor. 6. Wrap a towel or belt strap around the lower part of your affected leg. Then use the towel or belt strap to slowly pull your heel toward your buttock until you feel a stretch. 7. Hold for about 15 to 30 seconds, then relax your leg against the towel or belt strap. 8. Repeat 2 to 4 times. 9. Switch legs and repeat steps 1 through 4, even if only one knee is sore. Stationary exercise bike    If you do not have a stationary exercise bike at home, you can find one to ride at your local health club or community center. 5. Adjust the height of the bike seat so that your knee is slightly bent when your leg is extended downward. If your knee hurts when the pedal reaches the top, you can raise the seat so that your knee does not bend as much. 6. Start slowly. At first, try to do 5 to 10 minutes of cycling with little to no resistance. Then increase your time and the resistance bit by bit until you can do 20 to 30 minutes without pain. 7. If you start to have pain, rest your knee until your pain gets back to the level that is normal for you. Or cycle for less time or with less effort. Follow-up care is a key part of your treatment and safety. Be sure to make and go to all appointments, and call your doctor if you are having problems. It's also a good idea to know your test results and keep a list of the medicines you take. Where can you learn more? Go to http://papa-kathy.info/.   Enter C159 in the search box to learn more about \"Knee Arthritis: Exercises. \"  Current as of: May 23, 2016  Content Version: 11.2  © 7013-9772 [facility]. Care instructions adapted under license by [employer] (which disclaims liability or warranty for this information). If you have questions about a medical condition or this instruction, always ask your healthcare professional. [place] [address] any warranty or liability for your use of this information.

## 2017-05-31 NOTE — PROGRESS NOTES
[facility]  [address] [place], [facility] [place] [address]  Phone: [phone]  Fax: [phone]      Yuli Gan was seen today for back pain. Diagnoses and all orders for this visit:    Osteoarthritis of left knee, unspecified osteoarthritis type  -     KETOROLAC TROMETHAMINE INJ  -     ketorolac (TORADOL) 15 mg/mL soln injection; 4 mL by IntraMUSCular route once for 1 dose. -     THER/PROPH/DIAG INJECTION, SUBCUT/IM  -     REFERRAL TO PHYSICAL THERAPY  -     XR KNEE LT MIN 4 V; Future    Lumbar facet arthropathy (HCC)  -     diclofenac EC (VOLTAREN) 75 mg EC tablet; Take 1 Tab by mouth two (2) times a day. With food. -     REFERRAL TO PHYSICAL THERAPY    Gait abnormality  -     REFERRAL TO PHYSICAL THERAPY  -     XR KNEE LT MIN 4 V; Future    HNP (herniated nucleus pulposus), lumbar  -     gabapentin (NEURONTIN) 300 mg capsule; Take 1 po q am and 2 po q pm as directed  -     REFERRAL TO PHYSICAL THERAPY    Neuritis  -     gabapentin (NEURONTIN) 300 mg capsule; Take 1 po q am and 2 po q pm as directed  -     REFERRAL TO PHYSICAL THERAPY    Muscle spasm of back  -     tiZANidine (ZANAFLEX) 4 mg tablet; 1 po bid prn muscle spasm  -     REFERRAL TO PHYSICAL THERAPY         IMPRESSION AND PLAN:  Sophie Alvarado is a 61 y.o. female with history of lumbar pain. Pt reports some improvement with her lower back pain. She continues to experience pain both knees, L>R, and denies any recent falls or trauma. Pt admits to relief when taking Voltaren 75 mg and denies any stomach issues with the medication. 1) Pt was given information on knee arthritis exercises. 2) I recommended the Pt try water exercise. 3) [facility]. 4) Pt received a Toradol injection in the office today. 5) She received a refill of Voltaren 75 mg 1 tab BID with food. 6) Ancillary left knee x-ray series with sunrise view was ordered.    7) She received a refill of Neurontin 300 mg 1 tab QAM and 2 tabs QHS. 8) Pt also received a refill of Zanaflex 4 mg 1 tab BID prn muscle spasm. 9) Ms. Jerri Miller has a reminder for a \"due or due soon\" health maintenance. I have asked that she contact her primary care provider, Adriana Bain MD, for follow-up on this health maintenance. 10)  demonstrated consistency with prescribing.   11) Pt will follow-up in 1 month. HISTORY OF PRESENT ILLNESS:  Tia Drummond is a 61 y.o. female with history of lumbar pain. Pt reports some improvement with her lower back pain. She intermittently experiences pain radiating down right thigh. Pt continues to experience pain both knees, L>R. She denies any recent falls or trauma. Pt has tried cortisone injections in the knees in the past with Dr. Jacquie Severance with minimal relief. She reports that her right knee pain is interfering with her sleep. Pt admits to relief when taking Voltaren 75 mg and denies any stomach issues with the medication. She continues to take Neurontin 300 mg and Zanaflex 4 mg. Pt at this time desires to proceed with medication evaluation, a referral to physical therapy, and left knee x-rays. Pain Scale: /10    PCP: Adriana Bain MD       Past Medical History:   Diagnosis Date    ADHD (attention deficit hyperactivity disorder)     Psychiatrist Dr. Diana Ortega: Iliana An Dr. Diana Ortega: Ardine Pride    Arthritis     Back pain     Diabetes [facility])     pt states \"pre-diabetes\"   Niya Loco     Dermatologist Dr. Barber Lies Left knee pain 12/09/2015    Dr. Cortes Cherry        Social History     Social History    Marital status: LEGALLY      Spouse name: N/A    Number of children: N/A    Years of education: N/A     Occupational History    Not on file.      Social History Main Topics    Smoking status: Former Smoker    Smokeless tobacco: Not on file   [facility] Alcohol use No    Drug use: No    Sexual activity: Not on file     Other Topics Concern    Not on file     Social History Narrative       Current Outpatient Prescriptions   Medication Sig Dispense Refill    ketorolac (TORADOL) 15 mg/mL soln injection 4 mL by IntraMUSCular route once for 1 dose. 1 Vial 0    gabapentin (NEURONTIN) 300 mg capsule Take 1 po q am and 2 po q pm as directed 90 Cap 1    diclofenac EC (VOLTAREN) 75 mg EC tablet Take 1 Tab by mouth two (2) times a day. With food. 60 Tab 1    tiZANidine (ZANAFLEX) 4 mg tablet 1 po bid prn muscle spasm 45 Tab 1    ALPRAZolam (XANAX) 0.5 mg tablet Take 1 mg by mouth nightly as needed for Anxiety or Sleep. 1/1/17, QTY#30, 30 day supply. Dr. Cheikh Gloria  0    dextroamphetamine-amphetamine (ADDERALL) 10 mg tablet Take 1 Tab by mouth two (2) times a day. 10/26/16, QTY#60, 30 days, Dr. Cheikh Gloria  0    metFORMIN (GLUCOPHAGE) 500 mg tablet Take 500 mg by mouth daily (with breakfast).  methocarbamol (ROBAXIN) 750 mg tablet Take 1 Tab by mouth two (2) times daily as needed. 60 Tab 2    dextroamphetamine-amphetamine (ADDERALL) 20 mg tablet 1/2/17, QTY#45, 30 days.  hydrALAZINE (APRESOLINE) 25 mg tablet 11/21/16, QTY#30, 10 days. Ms. Radha Nunez. 0    triamcinolone acetonide (KENALOG) 0.1 % ointment 10/25/16, QTY#454 g, 30 days. Dr. Maged Forbes  0    predniSONE (DELTASONE) 20 mg tablet Begin taking tomorrow Saturday 1/7/17 as you received your initial dose while in the ER. 3 tabs po every day x 2 days, then 2 tabs po every day x 3 days, then 1 tab po every day x 3 days. 15 Tab 0    HYDROcodone-acetaminophen (NORCO) 5-325 mg per tablet Take 1 Tab by mouth every four (4) hours as needed (BREAKTHROUGH PAIN ONLY, DO NOT FILL UNLESS PREDNISONE, ROBAXIN IS FILLED.). Max Daily Amount: 6 Tabs. 20 Tab 0       No Known Allergies      REVIEW OF SYSTEMS    Constitutional: Negative for fever, chills, or weight change.    Respiratory: Negative for cough or shortness of breath. Cardiovascular: Negative for chest pain or palpitations. Gastrointestinal: Negative for acid reflux, change in bowel habits, or constipation. Genitourinary: Negative for dysuria and flank pain. Musculoskeletal: Positive for lumbar and left knee pain. Skin: Negative for rash. Neurological: Negative for headaches, dizziness, or numbness. Endo/Heme/Allergies: Negative for increased bruising. Psychiatric/Behavioral: Positive for difficulty with sleep. PHYSICAL EXAMINATION  Visit Vitals    /79    Pulse 65    Temp 98.3 °F (36.8 °C) (Oral)    Resp 16    Ht 5' 6\" (1.676 m)    Wt 223 lb 6.4 oz (101.3 kg)    BMI 36.06 kg/m2       Constitutional: Awake, alert, and in no acute distress  Neurological: 1+ symmetrical DTRs in the lower extremities. Sensation to light touch is intact. Skin: warm, dry, and intact. Musculoskeletal: Moderate pain with extension and axial loading. No pain with internal or external rotation of her hips. Negative straight leg raise bilaterally. Positive for medial joint line tenderness in the left knee with mild swelling. Hip Flex  Quads Hamstrings Ankle DF EHL Ankle PF   Right +4/5 +4/5 +4/5 +4/5 +4/5 +4/5   Left +4/5 +4/5 +4/5 +4/5 +4/5 +4/5     IMAGING:    Lumbar spine MRI from 03/10/2017 was personally reviewed with the Pt and demonstrated:    Results from [place] encounter on 03/10/17   MRI LUMB SPINE WO CONT   Narrative Sagittal and axial multisequence MR images of lumbar spine were obtained. Normal alignment. No compression fracture or pathologic marrow signal.  Hemangioma in L3. Conus medullaris ends at L1-L2 with normal morphology and  signal intensity. T11-T12: Posterior disc bulge with posterior lateral corner disc protrusion,  slightly more focal on the left. Facet and ligamentous hypertrophy. Mild central  stenosis. Moderate left and mild right foraminal stenosis.     T12-L1, L1-L2: No disc herniation, central or foraminal stenosis. L2-L3: Posterior disc bulge with mild right posterior lateral corner disc  protrusion. No central stenosis. Facet and ligamentous hypertrophy. Moderate  right foraminal stenosis with mild compression of right exiting L2 nerve root  suspected. L3-L4: Mild posterior disc bulge. Mild facet and ligamentous hypertrophy. No  significant foraminal stenosis. No central stenosis. L4-L5: Mild posterior disc bulge with no central stenosis. Facet and ligamentous  hypertrophy with no significant foraminal stenosis. L5-S1: Minimal posterior disc bulge with no central stenosis. Facet and  ligamentous hypertrophy with no significant foraminal stenosis. Impression IMPRESSION:  1. L2-L3: Disc disease with right foraminal stenosis and potential mild  compression of right exiting L2 nerve root. 2. Moderate left foraminal stenosis at T11-T12 with posterior lateral corner  disc protrusion and facet hypertrophy. Left knee x-rays from 09.04/2015 was personally reviewed with the Pt and demonstrated:  Findings:      There is no evidence of fracture or dislocation. Joint spaces and alignment are  within normal limits. Mild superior patellar spurring. Mild spurring of the  tibial plateau.     Impression:     No fracture or dislocation. Mild degenerative spurring as above. Written by Colleen Ferrari, as dictated by MD. ALONZO Miramontes, Dr. María Bell confirm that all documentation is accurate.

## 2017-05-31 NOTE — TELEPHONE ENCOUNTER
Pt is calling wanting to know when she is supposed to start aqua therapy at the [facility].  [phone]

## 2017-05-31 NOTE — MR AVS SNAPSHOT
Visit Information Date & Time Provider Department Dept. Phone Encounter #  
 5/31/2017  9:45 AM Tony Ortega MD [facility] [phone] [id number] Follow-up Instructions Return in about 6 weeks (around 7/12/2017) for PT follow up, Medication follow up. Upcoming Health Maintenance Date Due Hepatitis C Screening 1960 HEMOGLOBIN A1C Q6M 1960 FOOT EXAM Q1 1/28/1969 MICROALBUMIN Q1 1/28/1969 EYE EXAM RETINAL OR DILATED Q1 1/28/1969 Pneumococcal 19-64 Medium Risk (1 of 1 - PPSV23) 1/28/1978 DTaP/Tdap/Td series (1 - Tdap) 1/28/1980 PAP AKA CERVICAL CYTOLOGY 1/28/1980 FOBT Q 1 YEAR AGE 50-75 1/28/2009 LIPID PANEL Q1 6/21/2013 INFLUENZA AGE 9 TO ADULT 8/1/2017 BREAST CANCER SCRN MAMMOGRAM 7/26/2018 Allergies as of 5/31/2017  Review Complete On: 5/31/2017 By: Tony Ortega MD  
 No Known Allergies Current Immunizations  Never Reviewed No immunizations on file. Not reviewed this visit You Were Diagnosed With   
  
 Codes Comments Osteoarthritis of left knee, unspecified osteoarthritis type    -  Primary ICD-10-CM: M17.12 
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 
ICD-9-CM: 722.10 Gait abnormality     ICD-10-CM: R26.9 ICD-9-CM: 781.2 HNP (herniated nucleus pulposus), lumbar     ICD-10-CM: M51.26 
ICD-9-CM: 722.10 Neuritis     ICD-10-CM: M79.2 ICD-9-CM: 729.2 Muscle spasm of back     ICD-10-CM: A96.566 ICD-9-CM: 724.8 Vitals BP Pulse Temp Resp Height(growth percentile) Weight(growth percentile) 128/79 65 98.3 °F (36.8 °C) (Oral) 16 5' 6\" (1.676 m) 223 lb 6.4 oz (101.3 kg) BMI OB Status Smoking Status 36.06 kg/m2 Hysterectomy Former Smoker BMI and BSA Data Body Mass Index Body Surface Area 36.06 kg/m 2 2.17 m 2 Preferred Pharmacy Pharmacy Name Phone [facility] [address] [facility] [phone] Your Updated Medication List  
  
   
This list is accurate as of: 5/31/17 10:49 AM.  Always use your most recent med list.  
  
  
  
  
 ALPRAZolam 0.5 mg tablet Commonly known as:  Geena Sham Take 1 mg by mouth nightly as needed for Anxiety or Sleep. 1/1/17, QTY#30, 30 day supply. Dr. Amy Rodriguez * dextroamphetamine-amphetamine 10 mg tablet Commonly known as:  ADDERALL Take 1 Tab by mouth two (2) times a day. 10/26/16, QTY#60, 30 days, Dr. Amy Rodriguez * dextroamphetamine-amphetamine 20 mg tablet Commonly known as:  ADDERALL  
1/2/17, QTY#45, 30 days. diclofenac EC 75 mg EC tablet Commonly known as:  VOLTAREN Take 1 Tab by mouth two (2) times a day. With food. gabapentin 300 mg capsule Commonly known as:  NEURONTIN Take 1 po q am and 2 po q pm as directed  
  
 hydrALAZINE 25 mg tablet Commonly known as:  APRESOLINE  
11/21/16, QTY#30, 10 days. Ms. Delacruz Figures. HYDROcodone-acetaminophen 5-325 mg per tablet Commonly known as:  [address] Take 1 Tab by mouth every four (4) hours as needed (BREAKTHROUGH PAIN ONLY, DO NOT FILL UNLESS PREDNISONE, ROBAXIN IS FILLED.). Max Daily Amount: 6 Tabs.  
  
 ketorolac 15 mg/mL Soln injection Commonly known as:  TORADOL  
4 mL by IntraMUSCular route once for 1 dose. metFORMIN 500 mg tablet Commonly known as:  GLUCOPHAGE Take 500 mg by mouth daily (with breakfast). methocarbamol 750 mg tablet Commonly known as:  ROBAXIN Take 1 Tab by mouth two (2) times daily as needed. predniSONE 20 mg tablet Commonly known as:  Christella Salts Begin taking tomorrow Saturday 1/7/17 as you received your initial dose while in the ER.    3 tabs po every day x 2 days, then 2 tabs po every day x 3 days, then 1 tab po every day x 3 days. tiZANidine 4 mg tablet Commonly known as:  Carly Christians 1 po bid prn muscle spasm  
  
 triamcinolone acetonide 0.1 % ointment Commonly known as:  KENALOG  
10/25/16, QTY#454 g, 30 days. Dr. Vasu Romero * Notice: This list has 2 medication(s) that are the same as other medications prescribed for you. Read the directions carefully, and ask your doctor or other care provider to review them with you. We Performed the Following KETOROLAC TROMETHAMINE INJ [ [facility]] NM THER/PROPH/DIAG INJECTION, SUBCUT/IM [id number] CPT(R)] Follow-up Instructions Return in about 6 weeks (around 7/12/2017) for PT follow up, Medication follow up. Patient Instructions Low Back Pain: Exercises Your Care Instructions Here are some examples of typical rehabilitation exercises for your condition. Start each exercise slowly. Ease off the exercise if you start to have pain. Your doctor or physical therapist will tell you when you can start these exercises and which ones will work best for you. How to do the exercises Press-up 1. Lie on your stomach, supporting your body with your forearms. 2. Press your elbows down into the floor to raise your upper back. As you do this, relax your stomach muscles and allow your back to arch without using your back muscles. As your press up, do not let your hips or pelvis come off the floor. 3. Hold for 15 to 30 seconds, then relax. 4. Repeat 2 to 4 times. Alternate arm and leg (bird dog) exercise Note: Do this exercise slowly. Try to keep your body straight at all times, and do not let one hip drop lower than the other. 1. Start on the floor, on your hands and knees. 2. Tighten your belly muscles. 3. Raise one leg off the floor, and hold it straight out behind you. Be careful not to let your hip drop down, because that will twist your trunk. 4. Hold for about 6 seconds, then lower your leg and switch to the other leg. 5. Repeat 8 to 12 times on each leg. 6. Over time, work up to holding for 10 to 30 seconds each time. 7. If you feel stable and secure with your leg raised, try raising the opposite arm straight out in front of you at the same time. Knee-to-chest exercise 1. Lie on your back with your knees bent and your feet flat on the floor. 2. Bring one knee to your chest, keeping the other foot flat on the floor (or keeping the other leg straight, whichever feels better on your lower back). 3. Keep your lower back pressed to the floor. Hold for at least 15 to 30 seconds. 4. Relax, and lower the knee to the starting position. 5. Repeat with the other leg. Repeat 2 to 4 times with each leg. 6. To get more stretch, put your other leg flat on the floor while pulling your knee to your chest. 
Curl-ups 1. Lie on the floor on your back with your knees bent at a 90-degree angle. Your feet should be flat on the floor, about 12 inches from your buttocks. 2. Cross your arms over your chest. If this bothers your neck, try putting your hands behind your neck (not your head), with your elbows spread apart. 3. Slowly tighten your belly muscles and raise your shoulder blades off the floor. 4. Keep your head in line with your body, and do not press your chin to your chest. 
5. Hold this position for 1 or 2 seconds, then slowly lower yourself back down to the floor. 6. Repeat 8 to 12 times. Pelvic tilt exercise 1. Lie on your back with your knees bent. 2. \"Brace\" your stomach. This means to tighten your muscles by pulling in and imagining your belly button moving toward your spine. You should feel like your back is pressing to the floor and your hips and pelvis are rocking back. 3. Hold for about 6 seconds while you breathe smoothly. 4. Repeat 8 to 12 times. Heel dig bridging 1. Lie on your back with both knees bent and your ankles bent so that only your heels are digging into the floor. Your knees should be bent about 90 degrees. 2. Then push your heels into the floor, squeeze your buttocks, and lift your hips off the floor until your shoulders, hips, and knees are all in a straight line. 3. Hold for about 6 seconds as you continue to breathe normally, and then slowly lower your hips back down to the floor and rest for up to 10 seconds. 4. Do 8 to 12 repetitions. Hamstring stretch in doorway 1. Lie on your back in a doorway, with one leg through the open door. 2. Slide your leg up the wall to straighten your knee. You should feel a gentle stretch down the back of your leg. 3. Hold the stretch for at least 15 to 30 seconds. Do not arch your back, point your toes, or bend either knee. Keep one heel touching the floor and the other heel touching the wall. 4. Repeat with your other leg. 5. Do 2 to 4 times for each leg. Hip flexor stretch 1. Kneel on the floor with one knee bent and one leg behind you. Place your forward knee over your foot. Keep your other knee touching the floor. 2. Slowly push your hips forward until you feel a stretch in the upper thigh of your rear leg. 3. Hold the stretch for at least 15 to 30 seconds. Repeat with your other leg. 4. Do 2 to 4 times on each side. Wall sit 1. Stand with your back 10 to 12 inches away from a wall. 2. Lean into the wall until your back is flat against it. 3. Slowly slide down until your knees are slightly bent, pressing your lower back into the wall. 4. Hold for about 6 seconds, then slide back up the wall. 5. Repeat 8 to 12 times. Follow-up care is a key part of your treatment and safety. Be sure to make and go to all appointments, and call your doctor if you are having problems. It's also a good idea to know your test results and keep a list of the medicines you take. Where can you learn more? Go to http://papa-kathy.info/. Enter D807 in the search box to learn more about \"Low Back Pain: Exercises. \" Current as of: May 23, 2016 Content Version: 11.2 © 7461-9691 I-frontdesk. Care instructions adapted under license by [employer] (which disclaims liability or warranty for this information). If you have questions about a medical condition or this instruction, always ask your healthcare professional. [place] [address] any warranty or liability for your use of this information. Knee Arthritis: Exercises Your Care Instructions Here are some examples of exercises for knee arthritis. Start each exercise slowly. Ease off the exercise if you start to have pain. Your doctor or physical therapist will tell you when you can start these exercises and which ones will work best for you. How to do the exercises Knee flexion with heel slide 5. Lie on your back with your knees bent. 6. Slide your heel back by bending your affected knee as far as you can. Then hook your other foot around your ankle to help pull your heel even farther back. 7. Hold for about 6 seconds, then rest for up to 10 seconds. 8. Repeat 8 to 12 times. 9. Switch legs and repeat steps 1 through 4, even if only one knee is sore. [employer] 8. Sit with your affected leg straight and supported on the floor or a firm bed. Place a small, rolled-up towel under your knee. Your other leg should be bent, with that foot flat on the floor. 9. Tighten the thigh muscles of your affected leg by pressing the back of your knee down into the towel. 10. Hold for about 6 seconds, then rest for up to 10 seconds. 11. Repeat 8 to 12 times. 12. Switch legs and repeat steps 1 through 4, even if only one knee is sore. Straight-leg raises to the front 7.  Lie on your back with your good knee bent so that your foot rests flat on the floor. Your affected leg should be straight. Make sure that your low back has a normal curve. You should be able to slip your hand in between the floor and the small of your back, with your palm touching the floor and your back touching the back of your hand. 8. Tighten the thigh muscles in your affected leg by pressing the back of your knee flat down to the floor. Hold your knee straight. 9. Keeping the thigh muscles tight and your leg straight, lift your affected leg up so that your heel is about 12 inches off the floor. Hold for about 6 seconds, then lower slowly. 10. Relax for up to 10 seconds between repetitions. 11. Repeat 8 to 12 times. 12. Switch legs and repeat steps 1 through 5, even if only one knee is sore. Active knee flexion 7. Lie on your stomach with your knees straight. If your kneecap is uncomfortable, roll up a washcloth and put it under your leg just above your kneecap. 8. Lift the foot of your affected leg by bending the knee so that you bring the foot up toward your buttock. If this motion hurts, try it without bending your knee quite as far. This may help you avoid any painful motion. 9. Slowly move your leg up and down. 10. Repeat 8 to 12 times. 11. Switch legs and repeat steps 1 through 4, even if only one knee is sore. Quadriceps stretch (facedown) 5. Lie flat on your stomach, and rest your face on the floor. 6. Wrap a towel or belt strap around the lower part of your affected leg. Then use the towel or belt strap to slowly pull your heel toward your buttock until you feel a stretch. 7. Hold for about 15 to 30 seconds, then relax your leg against the towel or belt strap. 8. Repeat 2 to 4 times. 9. Switch legs and repeat steps 1 through 4, even if only one knee is sore. Stationary exercise bike If you do not have a stationary exercise bike at home, you can find one to ride at your local health club or community center. 5. Adjust the height of the bike seat so that your knee is slightly bent when your leg is extended downward. If your knee hurts when the pedal reaches the top, you can raise the seat so that your knee does not bend as much. 6. Start slowly. At first, try to do 5 to 10 minutes of cycling with little to no resistance. Then increase your time and the resistance bit by bit until you can do 20 to 30 minutes without pain. 7. If you start to have pain, rest your knee until your pain gets back to the level that is normal for you. Or cycle for less time or with less effort. Follow-up care is a key part of your treatment and safety. Be sure to make and go to all appointments, and call your doctor if you are having problems. It's also a good idea to know your test results and keep a list of the medicines you take. Where can you learn more? Go to http://papa-kathy.info/. Enter C159 in the search box to learn more about \"Knee Arthritis: Exercises. \" Current as of: May 23, 2016 Content Version: 11.2 © 4085-4433 Cylande. Care instructions adapted under license by [employer] (which disclaims liability or warranty for this information). If you have questions about a medical condition or this instruction, always ask your healthcare professional. [address] any warranty or liability for your use of this information. Introducing [facility]! Dylan Pina introduces [employer] patient portal. Now you can access parts of your medical record, email your doctor's office, and request medication refills online. 1. In your internet browser, go to [email or web address] 2. Click on the First Time User? Click Here link in the Sign In box. You will see the New Member Sign Up page. 3. Enter your [employer] Access Code exactly as it appears below. You will not need to use this code after youve completed the sign-up process.  If you do not sign up before the expiration date, you must request a new code. · [employer] Access Code: [email or web address] Expires: 8/29/2017  9:27 AM 
 
4. Enter the last four digits of your Social Security Number (xxxx) and Date of Birth (mm/dd/yyyy) as indicated and click Submit. You will be taken to the next sign-up page. 5. Create a [employer] ID. This will be your [employer] login ID and cannot be changed, so think of one that is secure and easy to remember. 6. Create a [employer] password. You can change your password at any time. 7. Enter your Password Reset Question and Answer. This can be used at a later time if you forget your password. 8. Enter your e-mail address. You will receive e-mail notification when new information is available in [address] 9. Click Sign Up. You can now view and download portions of your medical record. 10. Click the Download Summary menu link to download a portable copy of your medical information. If you have questions, please visit the Frequently Asked Questions section of the [employer] website. Remember, [employer] is NOT to be used for urgent needs. For medical emergencies, dial 911. Now available from your iPhone and Android! Please provide this summary of care documentation to your next provider. Your primary care clinician is listed as [address] If you have any questions after today's visit, please call [phone].

## 2017-05-31 NOTE — TELEPHONE ENCOUNTER
I called the patient and gave her the number to [facility] to call to set up the PT. I stated if she doesn't call you then you can call her.

## 2017-05-31 NOTE — TELEPHONE ENCOUNTER
Pt was requesting to see if a nurse could call her with a time of when she would be set up to do aqua therapy at the [facility].     Pt can be reached at [phone]

## 2017-06-12 ENCOUNTER — APPOINTMENT (OUTPATIENT)
Dept: PHYSICAL THERAPY | Age: 57
End: 2017-06-12

## 2017-07-27 ENCOUNTER — HOSPITAL ENCOUNTER (OUTPATIENT)
Dept: MAMMOGRAPHY | Age: 57
Discharge: HOME | End: 2017-07-27
Attending: INTERNAL MEDICINE
Payer: MEDICARE

## 2017-07-27 DIAGNOSIS — Z12.31 VISIT FOR SCREENING MAMMOGRAM: ICD-10-CM

## 2017-07-27 PROCEDURE — 77067 SCR MAMMO BI INCL CAD: CPT

## 2018-01-10 ENCOUNTER — HOSPITAL ENCOUNTER (EMERGENCY)
Age: 58
Discharge: HOME | End: 2018-01-10
Attending: EMERGENCY MEDICINE
Payer: MEDICARE

## 2018-01-10 ENCOUNTER — APPOINTMENT (OUTPATIENT)
Dept: GENERAL RADIOLOGY | Age: 58
End: 2018-01-10
Attending: EMERGENCY MEDICINE
Payer: MEDICARE

## 2018-01-10 VITALS
WEIGHT: 230 LBS | OXYGEN SATURATION: 95 % | TEMPERATURE: 98.5 F | BODY MASS INDEX: 36.1 KG/M2 | HEART RATE: 79 BPM | RESPIRATION RATE: 18 BRPM | HEIGHT: 67 IN | SYSTOLIC BLOOD PRESSURE: 99 MMHG | DIASTOLIC BLOOD PRESSURE: 68 MMHG

## 2018-01-10 DIAGNOSIS — J06.9 VIRAL URI WITH COUGH: Primary | ICD-10-CM

## 2018-01-10 PROCEDURE — 71046 X-RAY EXAM CHEST 2 VIEWS: CPT

## 2018-01-10 PROCEDURE — 99283 EMERGENCY DEPT VISIT LOW MDM: CPT

## 2018-01-10 RX ORDER — BENZONATATE 100 MG/1
100 CAPSULE ORAL
Qty: 15 CAP | Refills: 0 | Status: SHIPPED | OUTPATIENT
Start: 2018-01-10 | End: 2018-01-17

## 2018-01-10 NOTE — DISCHARGE INSTRUCTIONS
Upper Respiratory Infection (Cold): Care Instructions  Your Care Instructions    An upper respiratory infection, or URI, is an infection of the nose, sinuses, or throat. URIs are spread by coughs, sneezes, and direct contact. The common cold is the most frequent kind of URI. The flu and sinus infections are other kinds of URIs. Almost all URIs are caused by viruses. Antibiotics won't cure them. But you can treat most infections with home care. This may include drinking lots of fluids and taking over-the-counter pain medicine. You will probably feel better in 4 to 10 days. The doctor has checked you carefully, but problems can develop later. If you notice any problems or new symptoms, get medical treatment right away. Follow-up care is a key part of your treatment and safety. Be sure to make and go to all appointments, and call your doctor if you are having problems. It's also a good idea to know your test results and keep a list of the medicines you take. How can you care for yourself at home? · To prevent dehydration, drink plenty of fluids, enough so that your urine is light yellow or clear like water. Choose water and other caffeine-free clear liquids until you feel better. If you have kidney, heart, or liver disease and have to limit fluids, talk with your doctor before you increase the amount of fluids you drink. · Take an over-the-counter pain medicine, such as acetaminophen (Tylenol), ibuprofen (Advil, Motrin), or naproxen (Aleve). Read and follow all instructions on the label. · Before you use cough and cold medicines, check the label. These medicines may not be safe for young children or for people with certain health problems. · Be careful when taking over-the-counter cold or flu medicines and Tylenol at the same time. Many of these medicines have acetaminophen, which is Tylenol. Read the labels to make sure that you are not taking more than the recommended dose.  Too much acetaminophen (Tylenol) can be harmful. · Get plenty of rest.  · Do not smoke or allow others to smoke around you. If you need help quitting, talk to your doctor about stop-smoking programs and medicines. These can increase your chances of quitting for good. When should you call for help? Call 911 anytime you think you may need emergency care. For example, call if:  ? · You have severe trouble breathing. ?Call your doctor now or seek immediate medical care if:  ? · You seem to be getting much sicker. ? · You have new or worse trouble breathing. ? · You have a new or higher fever. ? · You have a new rash. ? Watch closely for changes in your health, and be sure to contact your doctor if:  ? · You have a new symptom, such as a sore throat, an earache, or sinus pain. ? · You cough more deeply or more often, especially if you notice more mucus or a change in the color of your mucus. ? · You do not get better as expected. Where can you learn more? Go to http://papa-kathy.info/. Enter G513 in the search box to learn more about \"Upper Respiratory Infection (Cold): Care Instructions. \"  Current as of: May 12, 2017  Content Version: 11.4  © 2409-7787 Heal[employer]ion. Care instructions adapted under license by [employer] (which disclaims liability or warranty for this information). If you have questions about a medical condition or this instruction, always ask your healthcare professional. [place] [address] any warranty or liability for your use of this information. MyChart Activation    Thank you for requesting access to [employer]. Please follow the instructions below to securely access and download your online medical record. O[employer] allows you to send messages to your doctor, view your test results, renew your prescriptions, schedule appointments, and more. How Do I Sign Up? 1. In your internet browser, go to [email or web address]  2.  Click on the First Time User? Click Here link in the Sign In box. You will be redirect to the New Member Sign Up page. 3. Enter your [employer] Access Code exactly as it appears below. You will not need to use this code after youve completed the sign-up process. If you do not sign up before the expiration date, you must request a new code. [employer] Access Code: [email or web address]  Expires: 4/10/2018 12:08 PM (This is the date your [employer] access code will )    4. Enter the last four digits of your Social Security Number (xxxx) and Date of Birth (mm/dd/yyyy) as indicated and click Submit. You will be taken to the next sign-up page. 5. Create a Net[employer]ish ID. This will be your Net[employer] login ID and cannot be changed, so think of one that is secure and easy to remember. 6. Create a [employer] password. You can change your password at any time. 7. Enter your Password Reset Question and Answer. This can be used at a later time if you forget your password. 8. Enter your e-mail address. You will receive e-mail notification when new information is available in [address] 9. Click Sign Up. You can now view and download portions of your medical record. 10. Click the Download Summary menu link to download a portable copy of your medical information. Additional Information    If you have questions, please visit the Frequently Asked Questions section of the Net[employer]enish website at [email or web address] Remember, [employer] is NOT to be used for urgent needs. For medical emergencies, dial 911.

## 2018-01-10 NOTE — ED PROVIDER NOTES
EMERGENCY DEPARTMENT HISTORY AND PHYSICAL EXAM    1:25 PM      Date: 1/10/2018  Patient Name: Heather Arriaza    History of Presenting Illness     Chief Complaint   Patient presents with    Cough    Nasal Congestion         History Provided By: Patient    Chief Complaint: cough and congestion x 2-3 days  Duration:  as noted above  Timing:  Gradual  Location: as noted above   Quality: n/a  Severity: Mild  Modifying Factors: NONE   Associated Symptoms: NONE       Additional History (Context): Heather Arriaza is a 61 y.o. female with No significant past medical history who presents with cough and congestion x 4-5 days but denies of any earache,fever, chills, n/v abdominal pain or earache. Denies taking any OTC meds. Haydee Elizabeth PCP: Tata Edge MD    Current Outpatient Prescriptions   Medication Sig Dispense Refill    benzonatate (TESSALON PERLES) 100 mg capsule Take 1 Cap by mouth three (3) times daily as needed for Cough. 15 Cap 0    ALPRAZolam (XANAX) 0.5 mg tablet Take 1 mg by mouth nightly as needed for Anxiety or Sleep. 1/1/17, QTY#30, 30 day supply. Dr. Clifford Galvan  0    dextroamphetamine-amphetamine (ADDERALL) 20 mg tablet 1/2/17, QTY#45, 30 days.  metFORMIN (GLUCOPHAGE) 500 mg tablet Take 500 mg by mouth daily (with breakfast).          Past History     Past Medical History:  Past Medical History:   Diagnosis Date    ADHD (attention deficit hyperactivity disorder)     Psychiatrist Dr. Clifford Galvan: Justice Galvan: Vince Jennings    Arthritis     Back pain     Diabetes [facility])     pt states \"pre-diabetes\"   Marquise Pacheco     Dermatologist Dr. Pranav Restrepo Left knee pain 12/09/2015    Dr. Jaimie Montalvo       Past Surgical History:  Past Surgical History:   Procedure Laterality Date    HX HYSTERECTOMY  2008    HX MYOMECTOMY      HX TUBAL LIGATION         Family History:  Family History   Problem Relation Age of Onset    Heart Disease Mother     Hypertension Father     Diabetes Father        Social History:  Social History   Substance Use Topics    Smoking status: Former Smoker    Smokeless tobacco: Not on file    Alcohol use No       Allergies:  No Known Allergies      Review of Systems       Review of Systems   HENT: Positive for congestion. Respiratory: Positive for cough. All other systems reviewed and are negative. Physical Exam     Visit Vitals    BP 99/68 (BP 1 Location: Left arm, BP Patient Position: At rest)    Pulse 79    Temp 98.5 °F (36.9 °C)    Resp 18    Ht 5' 7\" (1.702 m)    Wt 104.3 kg (230 lb)    SpO2 95%    BMI 36.02 kg/m2         Physical Exam   Constitutional: She is oriented to person, place, and time. She appears well-developed and well-nourished. No distress. HENT:   Head: Normocephalic and atraumatic. Right Ear: External ear normal.   Left Ear: External ear normal.   Nose: Nose normal.   Mouth/Throat: Oropharynx is clear and moist.   Eyes: Conjunctivae and EOM are normal. Pupils are equal, round, and reactive to light. Right eye exhibits no discharge. Left eye exhibits no discharge. No scleral icterus. Neck: Normal range of motion. Cardiovascular: Normal rate, regular rhythm and normal heart sounds. Pulmonary/Chest: Effort normal and breath sounds normal. No respiratory distress. She has no wheezes. She has no rales. She exhibits no tenderness. Abdominal: Soft. Bowel sounds are normal. She exhibits no distension. There is no tenderness. There is no rebound and no guarding. Musculoskeletal: Normal range of motion. Neurological: She is alert and oriented to person, place, and time. Skin: Skin is warm. She is not diaphoretic. Psychiatric: She has a normal mood and affect.  Her behavior is normal. Judgment and thought content normal.         Diagnostic Study Results     Labs -  No results found for this or any previous visit (from the past 12 hour(s)). Radiologic Studies -   XR CHEST PA LAT   Final Result            Medical Decision Making   I am the first provider for this patient. I reviewed the vital signs, available nursing notes, past medical history, past surgical history, family history and social history. Provider Notes (Medical Decision Making):   Given Rx for tessalon. Discharge and recommended to follow up with PCP. CXR with no acute finding. Diagnosis     Clinical Impression:   1. Viral URI with cough        Disposition: HOME    Follow-up Information     Follow up With Details Comments [address], [place] In 1 day  [address] [facility] [id number]  [phone] [address] [facility] EMERGENCY DEPT  As needed [address] [id number] [phone]           Patient's Medications   Start Taking    BENZONATATE (TESSALON PERLES) 100 MG CAPSULE    Take 1 Cap by mouth three (3) times daily as needed for Cough. Continue Taking    ALPRAZOLAM (XANAX) 0.5 MG TABLET    Take 1 mg by mouth nightly as needed for Anxiety or Sleep. 1/1/17, QTY#30, 30 day supply. Dr. Marti Cuevas (ADDERALL) 20 MG TABLET    1/2/17, QTY#45, 30 days. METFORMIN (GLUCOPHAGE) 500 MG TABLET    Take 500 mg by mouth daily (with breakfast). These Medications have changed    No medications on file   Stop Taking    DEXTROAMPHETAMINE-AMPHETAMINE (ADDERALL) 10 MG TABLET    Take 1 Tab by mouth two (2) times a day. 10/26/16, QTY#60, 30 days, Dr. Анна Fernandez    DICLOFENAC EC (VOLTAREN) 75 MG EC TABLET    Take 1 Tab by mouth two (2) times a day. With food. GABAPENTIN (NEURONTIN) 300 MG CAPSULE    Take 1 po q am and 2 po q pm as directed    HYDRALAZINE (APRESOLINE) 25 MG TABLET    11/21/16, QTY#30, 10 days. Ms. Nayana Prieto.     HYDROCODONE-ACETAMINOPHEN (NORCO) 5-325 MG PER TABLET    Take 1 Tab by mouth every four (4) hours as needed (BREAKTHROUGH PAIN ONLY, DO NOT FILL UNLESS PREDNISONE, ROBAXIN IS FILLED.). Max Daily Amount: 6 Tabs. METHOCARBAMOL (ROBAXIN) 750 MG TABLET    Take 1 Tab by mouth two (2) times daily as needed. PREDNISONE (DELTASONE) 20 MG TABLET    Begin taking tomorrow Saturday 1/7/17 as you received your initial dose while in the ER. 3 tabs po every day x 2 days, then 2 tabs po every day x 3 days, then 1 tab po every day x 3 days. TIZANIDINE (ZANAFLEX) 4 MG TABLET    1 po bid prn muscle spasm    TRIAMCINOLONE ACETONIDE (KENALOG) 0.1 % OINTMENT    10/25/16, QTY#454 g, 30 days.  Dr. Bonilla Hence

## 2018-01-17 ENCOUNTER — HOSPITAL ENCOUNTER (EMERGENCY)
Age: 58
Discharge: HOME | End: 2018-01-18
Attending: EMERGENCY MEDICINE | Admitting: EMERGENCY MEDICINE
Payer: MEDICARE

## 2018-01-17 VITALS
BODY MASS INDEX: 37.59 KG/M2 | OXYGEN SATURATION: 97 % | RESPIRATION RATE: 16 BRPM | HEART RATE: 71 BPM | TEMPERATURE: 98.1 F | WEIGHT: 240 LBS | DIASTOLIC BLOOD PRESSURE: 84 MMHG | SYSTOLIC BLOOD PRESSURE: 141 MMHG

## 2018-01-17 DIAGNOSIS — R11.15 NON-INTRACTABLE CYCLICAL VOMITING WITH NAUSEA: Primary | ICD-10-CM

## 2018-01-17 PROCEDURE — 99283 EMERGENCY DEPT VISIT LOW MDM: CPT

## 2018-01-17 RX ORDER — ONDANSETRON 8 MG/1
8 TABLET, ORALLY DISINTEGRATING ORAL
Status: COMPLETED | OUTPATIENT
Start: 2018-01-18 | End: 2018-01-18

## 2018-01-18 PROCEDURE — 74011250637 HC RX REV CODE- 250/637: Performed by: EMERGENCY MEDICINE

## 2018-01-18 RX ORDER — ONDANSETRON 8 MG/1
8 TABLET, ORALLY DISINTEGRATING ORAL
Qty: 10 TAB | Refills: 0 | Status: SHIPPED | OUTPATIENT
Start: 2018-01-18 | End: 2018-08-07

## 2018-01-18 RX ADMIN — ONDANSETRON 8 MG: 8 TABLET, ORALLY DISINTEGRATING ORAL at 00:05

## 2018-01-18 NOTE — DISCHARGE INSTRUCTIONS
Learning About Cyclic Vomiting Syndrome  What is it? An adult or child who has cyclic vomiting syndrome (CVS) has repeated bouts of severe vomiting and nausea. Between the vomiting episodes, the person's health is normal. The cause of CVS isn't known, but it may be related to migraine headaches. What happens when you have CVS?  CVS causes severe nausea, vomiting, and drooling. You may not be able to walk or talk during an episode. You may look pale. You may have a fever and feel very tired and thirsty. Some people with CVS have belly pain and diarrhea. Bouts of vomiting can last for a few hours or a few days. People with this condition tend to have a typical pattern of attacks. For example, one person may have bouts of CVS 4 times a year and always in the morning. Another person may have 8 bouts a year and always in the evening. Some people with CVS have triggers that set off the vomiting. People have reported an infection, such as a cold, as a trigger. Other triggers include stress, menstrual cycles, and certain foods like chocolate or cheese. Children tend to have more triggers than adults do. How is CVS treated? Treatment is centered around easing the nausea and vomiting. The doctor may prescribe medicine. During very bad bouts of vomiting, your doctor may want you to stay in the hospital for a while. You may get fluids through a vein (IV) to prevent dehydration. Dehydration can be serious. Your body needs fluids to make enough blood. Without a good supply of blood, vital organs such as the heart and brain can't work as well as they should. When should you call for help? Call your doctor now or seek immediate medical care if:  · You have new or worse belly pain. · You have a fever. · You are vomiting. · You cannot pass stools or gas. · You have symptoms of dehydration, such as:  ¨ Dry eyes and a dry mouth. ¨ Passing only a little urine.   ¨ Feeling thirstier than usual.  Watch closely for changes in your health, and be sure to contact your doctor if you have any problems. Follow-up care is a key part of your treatment and safety. Be sure to make and go to all appointments, and call your doctor if you are having problems. It's also a good idea to know your test results and keep a list of the medicines you take. Where can you learn more? Go to http://papa-kathy.info/. Enter K433 in the search box to learn more about \"Learning About Cyclic Vomiting Syndrome. \"  Current as of: May 12, 2017  Content Version: 11.4  © 0438-9892 [employer]. Care instructions adapted under license by [employer] (which disclaims liability or warranty for this information). If you have questions about a medical condition or this instruction, always ask your healthcare professional. [address] any warranty or liability for your use of this information.

## 2018-01-18 NOTE — ED PROVIDER NOTES
EMERGENCY DEPARTMENT HISTORY AND PHYSICAL EXAM    11:45 PM      Date: 1/17/2018  Patient Name: Eugenio Parnell    History of Presenting Illness     Chief Complaint   Patient presents with    Vomiting    Abdominal Pain         History Provided By: Patient    Chief Complaint: Abdominal Pain  Duration:  Hours  Timing:  Acute  Location: Gastrointestinal  Quality: Aching  Severity: Moderate  Modifying Factors: None  Associated Symptoms: Nausea, Vomiting      Additional History (Context): Eugenio Parnell is a 61 y.o. female with history of DM who presents to the ED c/o abdominal pain. Pt reports that she ate [employer] around 7 PM with her grand-daughter and grand-son. Pt notes that grand-daughter in ED at present with similar sx. Pt notes one episode of vomiting prior to arrival to ED. Pt denies any other acute sx at this time. PCP: Jess Robins MD    Current Outpatient Prescriptions   Medication Sig Dispense Refill    ALPRAZolam (XANAX) 0.5 mg tablet Take 1 mg by mouth nightly as needed for Anxiety or Sleep. 1/1/17, QTY#30, 30 day supply. Dr. Weston Whitehaven  0    dextroamphetamine-amphetamine (ADDERALL) 20 mg tablet 1/2/17, QTY#45, 30 days.  metFORMIN (GLUCOPHAGE) 500 mg tablet Take 500 mg by mouth daily (with breakfast).          Past History     Past Medical History:  Past Medical History:   Diagnosis Date    ADHD (attention deficit hyperactivity disorder)     Psychiatrist Dr. Weston Whitehaven: Amber Weston Whitehaven: Chloe Mendoza    Arthritis     Back pain     Diabetes [facility])     pt states \"pre-diabetes\"   Tawanna Graham     Dermatologist Dr. Jonatan Miles Left knee pain 12/09/2015    Dr. Vladimir Cerda       Past Surgical History:  Past Surgical History:   Procedure Laterality Date    HX HYSTERECTOMY  2008    HX MYOMECTOMY      HX TUBAL LIGATION         Family History:  Family History   Problem Relation Age of Onset  Heart Disease Mother     Hypertension Father     Diabetes Father        Social History:  Social History   Substance Use Topics    Smoking status: Former Smoker    Smokeless tobacco: None    Alcohol use No       Allergies:  No Known Allergies      Review of Systems       Review of Systems   Constitutional: Negative for fever. HENT: Negative for congestion. Respiratory: Negative for cough and shortness of breath. Cardiovascular: Negative for chest pain. Gastrointestinal: Positive for abdominal pain, nausea and vomiting. Musculoskeletal: Negative for back pain. Skin: Negative for rash. Neurological: Negative for light-headedness. All other systems reviewed and are negative. Physical Exam     Visit Vitals    /84    Pulse 71    Temp 98.1 °F (36.7 °C)    Resp 16    Wt 108.9 kg (240 lb)    SpO2 97%    BMI 37.59 kg/m2       Physical Exam   Constitutional: She is oriented to person, place, and time. She appears well-developed. HENT:   Head: Normocephalic. Mouth/Throat: Oropharynx is clear and moist.   Eyes: Pupils are equal, round, and reactive to light. Neck: Normal range of motion. Cardiovascular: Normal rate and normal heart sounds. No murmur heard. Pulmonary/Chest: Effort normal. She has no wheezes. She has no rales. Abdominal: Soft. There is no tenderness. Musculoskeletal: Normal range of motion. She exhibits no edema. Neurological: She is alert and oriented to person, place, and time. Skin: Skin is warm and dry. Nursing note and vitals reviewed. Diagnostic Study Results     Labs -  No results found for this or any previous visit (from the past 12 hour(s)). Radiologic Studies -   No orders to display         Medical Decision Making   I am the first provider for this patient. I reviewed the vital signs, available nursing notes, past medical history, past surgical history, family history and social history.     Vital Signs-Reviewed the patient's vital signs. Pulse Oximetry Analysis -  97% on room air (Normal)    Records Reviewed: Nursing Notes (Time of Review: 11:45 PM)    ED Course: Progress Notes, Reevaluation, and Consults:    Provider Notes (Medical Decision Making):       Diagnosis     Clinical Impression: No diagnosis found. Disposition: DISCHARGE    Follow-up Information     None           Patient's Medications   Start Taking    No medications on file   Continue Taking    ALPRAZOLAM (XANAX) 0.5 MG TABLET    Take 1 mg by mouth nightly as needed for Anxiety or Sleep. 1/1/17, QTY#30, 30 day supply. Dr. Garrett Mathis (ADDERALL) 20 MG TABLET    1/2/17, QTY#45, 30 days. METFORMIN (GLUCOPHAGE) 500 MG TABLET    Take 500 mg by mouth daily (with breakfast). These Medications have changed    No medications on file   Stop Taking    BENZONATATE (TESSALON PERLES) 100 MG CAPSULE    Take 1 Cap by mouth three (3) times daily as needed for Cough.     _______________________________    Attestations:  Scribe Attestation     Stacy Whitt acting as a scribe for and in the presence of Nelly Nicolas MD      January 17, 2018 at 11:54 PM       Provider Attestation:      I personally performed the services described in the documentation, reviewed the documentation, as recorded by the scribe in my presence, and it accurately and completely records my words and actions.  January 17, 2018 at 11:54 PM - Nelly Nicolas MD    _______________________________

## 2018-07-30 ENCOUNTER — HOSPITAL ENCOUNTER (OUTPATIENT)
Dept: MAMMOGRAPHY | Age: 58
Discharge: HOME | End: 2018-07-30
Attending: INTERNAL MEDICINE
Payer: MEDICARE

## 2018-07-30 DIAGNOSIS — Z12.31 VISIT FOR SCREENING MAMMOGRAM: ICD-10-CM

## 2018-07-30 PROCEDURE — 77063 BREAST TOMOSYNTHESIS BI: CPT

## 2018-08-07 ENCOUNTER — HOSPITAL ENCOUNTER (EMERGENCY)
Age: 58
Discharge: HOME | End: 2018-08-07
Attending: EMERGENCY MEDICINE
Payer: MEDICARE

## 2018-08-07 ENCOUNTER — APPOINTMENT (OUTPATIENT)
Dept: GENERAL RADIOLOGY | Age: 58
End: 2018-08-07
Attending: PHYSICIAN ASSISTANT
Payer: MEDICARE

## 2018-08-07 ENCOUNTER — APPOINTMENT (OUTPATIENT)
Dept: CT IMAGING | Age: 58
End: 2018-08-07
Attending: PHYSICIAN ASSISTANT
Payer: MEDICARE

## 2018-08-07 VITALS
DIASTOLIC BLOOD PRESSURE: 79 MMHG | RESPIRATION RATE: 18 BRPM | HEIGHT: 67 IN | SYSTOLIC BLOOD PRESSURE: 112 MMHG | WEIGHT: 224 LBS | TEMPERATURE: 98.7 F | HEART RATE: 83 BPM | BODY MASS INDEX: 35.16 KG/M2 | OXYGEN SATURATION: 95 %

## 2018-08-07 DIAGNOSIS — M54.50 ACUTE BILATERAL LOW BACK PAIN WITHOUT SCIATICA: ICD-10-CM
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
## 2018-08-07 DIAGNOSIS — S16.1XXA STRAIN OF NECK MUSCLE, INITIAL ENCOUNTER: ICD-10-CM

## 2018-08-07 PROCEDURE — 99282 EMERGENCY DEPT VISIT SF MDM: CPT

## 2018-08-07 PROCEDURE — 70450 CT HEAD/BRAIN W/O DYE: CPT

## 2018-08-07 PROCEDURE — 72100 X-RAY EXAM L-S SPINE 2/3 VWS: CPT

## 2018-08-07 PROCEDURE — 72125 CT NECK SPINE W/O DYE: CPT

## 2018-08-07 RX ORDER — METHOCARBAMOL 500 MG/1
500 TABLET, FILM COATED ORAL 4 TIMES DAILY
Qty: 16 TAB | Refills: 0 | Status: SHIPPED | OUTPATIENT
Start: 2018-08-07

## 2018-08-07 RX ORDER — NAPROXEN 500 MG/1
500 TABLET ORAL 2 TIMES DAILY WITH MEALS
Qty: 20 TAB | Refills: 0 | Status: SHIPPED | OUTPATIENT
Start: 2018-08-07
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
## 2018-08-07 NOTE — DISCHARGE INSTRUCTIONS
Back Pain: Care Instructions  Your Care Instructions    Back pain has many possible causes. It is often related to problems with muscles and ligaments of the back. It may also be related to problems with the nerves, discs, or bones of the back. Moving, lifting, standing, sitting, or sleeping in an awkward way can strain the back. Sometimes you don't notice the injury until later. Arthritis is another common cause of back pain. Although it may hurt a lot, back pain usually improves on its own within several weeks. Most people recover in 12 weeks or less. Using good home treatment and being careful not to stress your back can help you feel better sooner. Follow-up care is a key part of your treatment and safety. Be sure to make and go to all appointments, and call your doctor if you are having problems. It's also a good idea to know your test results and keep a list of the medicines you take. How can you care for yourself at home? · Sit or lie in positions that are most comfortable and reduce your pain. Try one of these positions when you lie down:  ¨ Lie on your back with your knees bent and supported by large pillows. ¨ Lie on the floor with your legs on the seat of a sofa or chair. Dallie Ryann on your side with your knees and hips bent and a pillow between your legs. ¨ Lie on your stomach if it does not make pain worse. · Do not sit up in bed, and avoid soft couches and twisted positions. Bed rest can help relieve pain at first, but it delays healing. Avoid bed rest after the first day of back pain. · Change positions every 30 minutes. If you must sit for long periods of time, take breaks from sitting. Get up and walk around, or lie in a comfortable position. · Try using a heating pad on a low or medium setting for 15 to 20 minutes every 2 or 3 hours. Try a warm shower in place of one session with the heating pad. · You can also try an ice pack for 10 to 15 minutes every 2 to 3 hours.  Put a thin cloth between the ice pack and your skin. · Take pain medicines exactly as directed. ¨ If the doctor gave you a prescription medicine for pain, take it as prescribed. ¨ If you are not taking a prescription pain medicine, ask your doctor if you can take an over-the-counter medicine. · Take short walks several times a day. You can start with 5 to 10 minutes, 3 or 4 times a day, and work up to longer walks. Walk on level surfaces and avoid hills and stairs until your back is better. · Return to work and other activities as soon as you can. Continued rest without activity is usually not good for your back. · To prevent future back pain, do exercises to stretch and strengthen your back and stomach. Learn how to use good posture, safe lifting techniques, and proper body mechanics. When should you call for help? Call your doctor now or seek immediate medical care if:    · You have new or worsening numbness in your legs.     · You have new or worsening weakness in your legs. (This could make it hard to stand up.)     · You lose control of your bladder or bowels.    Watch closely for changes in your health, and be sure to contact your doctor if:    · You have a fever, lose weight, or don't feel well.     · You do not get better as expected. Where can you learn more? Go to http://papa-kathy.info/. Enter N937 in the search box to learn more about \"Back Pain: Care Instructions. \"  Current as of: November 29, 2017  Content Version: 11.7  © 6642-9598 [employer]. Care instructions adapted under license by [employer] (which disclaims liability or warranty for this information). If you have questions about a medical condition or this instruction, always ask your healthcare professional. [address] any warranty or liability for your use of this information. MyChart Activation    Thank you for requesting access to [employer].  Please follow the instructions below to securely access and download your online medical record. [employer] allows you to send messages to your doctor, view your test results, renew your prescriptions, schedule appointments, and more. How Do I Sign Up? 1. In your internet browser, go to [email or web address]  2. Click on the First Time User? Click Here link in the Sign In box. You will be redirect to the New Member Sign Up page. 3. Enter your [employer] Access Code exactly as it appears below. You will not need to use this code after youve completed the sign-up process. If you do not sign up before the expiration date, you must request a new code. [employer] Access Code: [email or web address]  Expires: 10/3/2018  2:08 PM (This is the date your [employer] access code will )    4. Enter the last four digits of your Social Security Number (xxxx) and Date of Birth (mm/dd/yyyy) as indicated and click Submit. You will be taken to the next sign-up page. 5. Create a [employer] ID. This will be your [employer] login ID and cannot be changed, so think of one that is secure and easy to remember. 6. Create a [employer] password. You can change your password at any time. 7. Enter your Password Reset Question and Answer. This can be used at a later time if you forget your password. 8. Enter your e-mail address. You will receive e-mail notification when new information is available in [address] 9. Click Sign Up. You can now view and download portions of your medical record. 10. Click the Download Summary menu link to download a portable copy of your medical information. Additional Information    If you have questions, please visit the Frequently Asked Questions section of the [employer] website at [email or web address] Remember, [employer] is NOT to be used for urgent needs. For medical emergencies, dial 911.

## 2018-08-15 ENCOUNTER — OFFICE VISIT (OUTPATIENT)
Dept: ORTHOPEDIC SURGERY | Facility: CLINIC | Age: 58
End: 2018-08-15

## 2018-08-15 VITALS
WEIGHT: 224.4 LBS | OXYGEN SATURATION: 100 % | RESPIRATION RATE: 18 BRPM | SYSTOLIC BLOOD PRESSURE: 122 MMHG | BODY MASS INDEX: 35.22 KG/M2 | HEART RATE: 72 BPM | TEMPERATURE: 97.8 F | DIASTOLIC BLOOD PRESSURE: 70 MMHG | HEIGHT: 67 IN

## 2018-08-15 DIAGNOSIS — M47.816 OSTEOARTHRITIS OF LUMBAR SPINE, UNSPECIFIED SPINAL OSTEOARTHRITIS COMPLICATION STATUS: ICD-10-CM

## 2018-08-15 DIAGNOSIS — M25.612 DECREASED RANGE OF MOTION OF SHOULDER, LEFT: ICD-10-CM

## 2018-08-15 DIAGNOSIS — M25.512 PAIN IN SHOULDER REGION, LEFT: ICD-10-CM

## 2018-08-15 DIAGNOSIS — S16.1XXA CERVICAL STRAIN, ACUTE, INITIAL ENCOUNTER: ICD-10-CM

## 2018-08-15 DIAGNOSIS — M17.12 PRIMARY OSTEOARTHRITIS OF LEFT KNEE: ICD-10-CM

## 2018-08-15 DIAGNOSIS — M62.838 MUSCLE SPASM: ICD-10-CM

## 2018-08-15 DIAGNOSIS — M54.9 BACK PAIN, UNSPECIFIED BACK LOCATION, UNSPECIFIED BACK PAIN LATERALITY, UNSPECIFIED CHRONICITY: Primary | ICD-10-CM
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
## 2018-08-15 PROBLEM — E66.01 SEVERE OBESITY (BMI 35.0-39.9): Status: ACTIVE | Noted: 2018-08-15

## 2018-08-15 RX ORDER — CYCLOBENZAPRINE HCL 10 MG
TABLET ORAL
COMMUNITY
Start: 2018-08-14

## 2018-08-15 RX ORDER — HYDROCODONE BITARTRATE AND ACETAMINOPHEN 7.5; 325 MG/1; MG/1
1 TABLET ORAL
Qty: 14 TAB | Refills: 0 | Status: SHIPPED | OUTPATIENT
Start: 2018-08-15 | End: 2018-08-28 | Stop reason: SDUPTHER

## 2018-08-15 RX ORDER — ESCITALOPRAM OXALATE 20 MG/1
TABLET ORAL
Refills: 2 | COMMUNITY
Start: 2018-05-19

## 2018-08-15 RX ORDER — NALOXONE HYDROCHLORIDE 2 MG/.4ML
2 INJECTION, SOLUTION INTRAMUSCULAR; SUBCUTANEOUS
Qty: 1 DEVICE | Refills: 0 | Status: SHIPPED | OUTPATIENT
Start: 2018-08-15 | End: 2018-08-15

## 2018-08-15 RX ORDER — DICLOFENAC SODIUM 75 MG/1
TABLET, DELAYED RELEASE ORAL
COMMUNITY
Start: 2018-08-14
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
## 2018-08-20 ENCOUNTER — HOSPITAL ENCOUNTER (OUTPATIENT)
Dept: PHYSICAL THERAPY | Age: 58
Discharge: HOME | End: 2018-08-20
Payer: MEDICARE

## 2018-08-20 PROCEDURE — 97110 THERAPEUTIC EXERCISES: CPT

## 2018-08-20 PROCEDURE — G8985 CARRY GOAL STATUS: HCPCS

## 2018-08-20 PROCEDURE — G8984 CARRY CURRENT STATUS: HCPCS

## 2018-08-20 PROCEDURE — 97161 PT EVAL LOW COMPLEX 20 MIN: CPT
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
## 2018-08-27 ENCOUNTER — OFFICE VISIT (OUTPATIENT)
Dept: ORTHOPEDIC SURGERY | Facility: CLINIC | Age: 58
End: 2018-08-27

## 2018-08-27 ENCOUNTER — APPOINTMENT (OUTPATIENT)
Dept: PHYSICAL THERAPY | Age: 58
End: 2018-08-27
Payer: MEDICARE

## 2018-08-27 VITALS
WEIGHT: 224 LBS | DIASTOLIC BLOOD PRESSURE: 74 MMHG | SYSTOLIC BLOOD PRESSURE: 115 MMHG | BODY MASS INDEX: 35.16 KG/M2 | OXYGEN SATURATION: 98 % | HEART RATE: 90 BPM | HEIGHT: 67 IN

## 2018-08-27 DIAGNOSIS — M25.612 DECREASED RANGE OF MOTION OF SHOULDER, LEFT: ICD-10-CM

## 2018-08-27 DIAGNOSIS — M17.12 PRIMARY OSTEOARTHRITIS OF LEFT KNEE: Primary | ICD-10-CM

## 2018-08-27 DIAGNOSIS — M25.512 PAIN IN SHOULDER REGION, LEFT: ICD-10-CM

## 2018-08-27 DIAGNOSIS — M62.838 MUSCLE SPASM: ICD-10-CM

## 2018-08-27 DIAGNOSIS — M25.562 LEFT KNEE PAIN, UNSPECIFIED CHRONICITY: ICD-10-CM
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
## 2018-08-28 ENCOUNTER — HOSPITAL ENCOUNTER (OUTPATIENT)
Dept: PHYSICAL THERAPY | Age: 58
Discharge: HOME | End: 2018-08-28
Payer: MEDICARE
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
## 2018-08-28 DIAGNOSIS — M54.9 BACK PAIN, UNSPECIFIED BACK LOCATION, UNSPECIFIED BACK PAIN LATERALITY, UNSPECIFIED CHRONICITY: ICD-10-CM

## 2018-08-28 DIAGNOSIS — M17.12 PRIMARY OSTEOARTHRITIS OF LEFT KNEE: ICD-10-CM

## 2018-08-28 DIAGNOSIS — M25.612 DECREASED RANGE OF MOTION OF SHOULDER, LEFT: ICD-10-CM

## 2018-08-28 PROCEDURE — 97110 THERAPEUTIC EXERCISES: CPT

## 2018-08-28 RX ORDER — HYDROCODONE BITARTRATE AND ACETAMINOPHEN 7.5; 325 MG/1; MG/1
1 TABLET ORAL
Qty: 14 TAB | Refills: 0 | Status: SHIPPED | OUTPATIENT
Start: 2018-08-28
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
## 2018-09-04 ENCOUNTER — APPOINTMENT (OUTPATIENT)
Dept: PHYSICAL THERAPY | Age: 58
End: 2018-09-04
Payer: MEDICARE

## 2018-09-10 ENCOUNTER — APPOINTMENT (OUTPATIENT)
Dept: PHYSICAL THERAPY | Age: 58
End: 2018-09-10
Payer: MEDICARE

## 2018-09-11 ENCOUNTER — HOSPITAL ENCOUNTER (OUTPATIENT)
Dept: PHYSICAL THERAPY | Age: 58
Discharge: HOME | End: 2018-09-11
Payer: MEDICARE

## 2018-09-11 PROCEDURE — 97110 THERAPEUTIC EXERCISES: CPT
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
## 2018-09-17 ENCOUNTER — HOSPITAL ENCOUNTER (OUTPATIENT)
Dept: PHYSICAL THERAPY | Age: 58
End: 2018-09-17
Payer: MEDICARE

## 2018-09-18 ENCOUNTER — HOSPITAL ENCOUNTER (OUTPATIENT)
Dept: PHYSICAL THERAPY | Age: 58
Discharge: HOME | End: 2018-09-18
Payer: MEDICARE

## 2018-09-18 PROCEDURE — G8985 CARRY GOAL STATUS: HCPCS

## 2018-09-18 PROCEDURE — 97110 THERAPEUTIC EXERCISES: CPT

## 2018-09-18 PROCEDURE — G8984 CARRY CURRENT STATUS: HCPCS

## 2018-09-18 PROCEDURE — 97112 NEUROMUSCULAR REEDUCATION: CPT
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
## 2018-09-24 ENCOUNTER — OFFICE VISIT (OUTPATIENT)
Dept: ORTHOPEDIC SURGERY | Facility: CLINIC | Age: 58
End: 2018-09-24

## 2018-09-24 ENCOUNTER — HOSPITAL ENCOUNTER (OUTPATIENT)
Dept: PHYSICAL THERAPY | Age: 58
Discharge: HOME | End: 2018-09-24
Payer: MEDICARE

## 2018-09-24 VITALS
BODY MASS INDEX: 35.16 KG/M2 | SYSTOLIC BLOOD PRESSURE: 123 MMHG | RESPIRATION RATE: 16 BRPM | OXYGEN SATURATION: 99 % | HEIGHT: 67 IN | DIASTOLIC BLOOD PRESSURE: 72 MMHG | HEART RATE: 89 BPM | WEIGHT: 224 LBS | TEMPERATURE: 97.6 F

## 2018-09-24 DIAGNOSIS — M25.612 DECREASED RANGE OF MOTION OF SHOULDER, LEFT: ICD-10-CM

## 2018-09-24 DIAGNOSIS — M54.9 BACK PAIN, UNSPECIFIED BACK LOCATION, UNSPECIFIED BACK PAIN LATERALITY, UNSPECIFIED CHRONICITY: Primary | ICD-10-CM

## 2018-09-24 DIAGNOSIS — S16.1XXA CERVICAL STRAIN, ACUTE, INITIAL ENCOUNTER: ICD-10-CM

## 2018-09-24 PROCEDURE — 97110 THERAPEUTIC EXERCISES: CPT

## 2018-09-24 PROCEDURE — 97112 NEUROMUSCULAR REEDUCATION: CPT

## 2018-09-24 RX ORDER — CYCLOBENZAPRINE HCL 10 MG
10 TABLET ORAL
Qty: 30 TAB | Refills: 0 | Status: SHIPPED | OUTPATIENT
Start: 2018-09-24
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
## 2018-09-25 ENCOUNTER — APPOINTMENT (OUTPATIENT)
Dept: PHYSICAL THERAPY | Age: 58
End: 2018-09-25
Payer: MEDICARE

## 2018-10-01 ENCOUNTER — HOSPITAL ENCOUNTER (OUTPATIENT)
Dept: PHYSICAL THERAPY | Age: 58
Discharge: HOME | End: 2018-10-01
Payer: MEDICARE

## 2018-10-01 PROCEDURE — 97110 THERAPEUTIC EXERCISES: CPT

## 2018-10-01 PROCEDURE — 97112 NEUROMUSCULAR REEDUCATION: CPT
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
## 2018-10-02 ENCOUNTER — APPOINTMENT (OUTPATIENT)
Dept: PHYSICAL THERAPY | Age: 58
End: 2018-10-02
Payer: MEDICARE

## 2018-10-04 ENCOUNTER — TELEPHONE (OUTPATIENT)
Dept: ORTHOPEDIC SURGERY | Age: 58
End: 2018-10-04

## 2018-10-04 NOTE — TELEPHONE ENCOUNTER
Pt states per [address]-- her insurance will not cover refill for flexeril. Pt to find out if a prior auth is needed. She will call us back.

## 2018-10-04 NOTE — TELEPHONE ENCOUNTER
Called patient to advise her per Carol Vegaoter to pay for RX and she said she got it from pharmacy for 41 cents.

## 2018-10-08 ENCOUNTER — APPOINTMENT (OUTPATIENT)
Dept: PHYSICAL THERAPY | Age: 58
End: 2018-10-08
Payer: MEDICARE

## 2018-10-09 ENCOUNTER — HOSPITAL ENCOUNTER (OUTPATIENT)
Dept: PHYSICAL THERAPY | Age: 58
Discharge: HOME | End: 2018-10-09
Payer: MEDICARE

## 2018-10-09 PROCEDURE — 97112 NEUROMUSCULAR REEDUCATION: CPT
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
## 2018-10-15 ENCOUNTER — HOSPITAL ENCOUNTER (OUTPATIENT)
Dept: PHYSICAL THERAPY | Age: 58
Discharge: HOME | End: 2018-10-15
Payer: MEDICARE

## 2018-10-15 PROCEDURE — 97112 NEUROMUSCULAR REEDUCATION: CPT
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
## 2018-10-22 ENCOUNTER — HOSPITAL ENCOUNTER (OUTPATIENT)
Dept: PHYSICAL THERAPY | Age: 58
Discharge: HOME | End: 2018-10-22
Payer: MEDICARE

## 2018-10-22 ENCOUNTER — OFFICE VISIT (OUTPATIENT)
Dept: ORTHOPEDIC SURGERY | Facility: CLINIC | Age: 58
End: 2018-10-22

## 2018-10-22 VITALS
HEART RATE: 85 BPM | RESPIRATION RATE: 18 BRPM | OXYGEN SATURATION: 97 % | TEMPERATURE: 97.6 F | WEIGHT: 228 LBS | BODY MASS INDEX: 35.79 KG/M2 | DIASTOLIC BLOOD PRESSURE: 73 MMHG | HEIGHT: 67 IN | SYSTOLIC BLOOD PRESSURE: 123 MMHG
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
## 2018-10-22 DIAGNOSIS — M62.838 MUSCLE SPASM: ICD-10-CM

## 2018-10-22 DIAGNOSIS — M54.9 BACK PAIN, UNSPECIFIED BACK LOCATION, UNSPECIFIED BACK PAIN LATERALITY, UNSPECIFIED CHRONICITY: Primary | ICD-10-CM

## 2018-10-22 DIAGNOSIS — M25.612 DECREASED RANGE OF MOTION OF SHOULDER, LEFT: ICD-10-CM

## 2018-10-22 PROCEDURE — G8986 CARRY D/C STATUS: HCPCS

## 2018-10-22 PROCEDURE — 97112 NEUROMUSCULAR REEDUCATION: CPT

## 2018-10-22 PROCEDURE — 97110 THERAPEUTIC EXERCISES: CPT

## 2018-10-22 PROCEDURE — G8985 CARRY GOAL STATUS: HCPCS

## 2018-10-22 NOTE — PROGRESS NOTES
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[place] [employer] / Plan: Nettie Aguirre y / Product Type: Medicare / In time:2:00  Out time:2:40 Total Treatment Time (min): 40 Visit #: 5 of 8 Medicare/BCBS Only Total Timed Codes (min):  30 1:1 Treatment Time:  30 Treatment Area: Spondylosis without myelopathy or radiculopathy, lumbar region [M47.816] Stiffness of left shoulder, not elsewhere classified [M25.612] SUBJECTIVE Pain Level (0-10 scale): 0/10 Any medication changes, allergies to medications, adverse drug reactions, diagnosis change, or new procedure performed?: [x] No    [] Yes (see summary sheet for update) Subjective functional status/changes:   [] No changes reported \"I'm doing good. The Doctor said 4 more weeks of therapy but I'm okay. \" OBJECTIVE Modality rationale: increase tissue extensibility to improve the patients ability to perform ADL's. Min Type Additional Details  
 [] Estim:  []Unatt       []IFC  []Premod []Other:  []w/ice   []w/heat Position: Location:  
 [] Estim: []Att    []TENS instruct  []NMES []Other:  []w/US   []w/ice   []w/heat Position: Location:  
 []  Traction: [] Cervical       []Lumbar 
                     [] Prone          []Supine []Intermittent   []Continuous Lbs: 
[] before manual 
[] after manual  
 []  Ultrasound: []Continuous   [] Pulsed []1MHz   []3MHz W/cm2: 
Location:  
 []  Iontophoresis with dexamethasone Location: [] Take home patch  
[] In clinic  
10 []  Ice     [x]  heat 
[]  Ice massage 
[]  Laser  
[]  Anodyne Position: Seated Location: Left shoulder  
 []  Laser with stim 
[]  Other:  Position: Location:  
 []  Vasopneumatic Device Pressure:       [] lo [] med [] hi  
Temperature: [] lo [] med [] hi  
 [] Skin assessment post-treatment:  []intact []redness- no adverse reaction 
  []redness  adverse reaction:  
 
22 min Therapeutic Exercise:  [x] See flow sheet :  
Rationale: increase ROM and increase strength to improve the patients ability to perform ADL's. 
  
8 min Neuromuscular Re-education:  [x]  See flow sheet :  
Rationale: increase strength and increase proprioception  to improve the patients ability to perform functional activities. With 
 [x] TE 
 [] TA 
 [] neuro 
 [] other: Patient Education: [x] Review HEP [] Progressed/Changed HEP based on:  
[] positioning   [] body mechanics   [] transfers   [] heat/ice application   
[] other:   
 
Other Objective/Functional Measures: MMT (B) LE's 4-/5 to 4/5 grossly. FOTO Left Shoulder 57%. FOTO Neck 96%. FOTO Lumbar 88%. Pain Level (0-10 scale) post treatment: 0/10 ASSESSMENT/Changes in Function:  
  
[]  See Plan of Care 
[]  See progress note/recertification 
[x]  See Discharge Summary Progress towards goals / Updated goals: 
Goals for this certification period to be accomplished in 3-4 weeks: 1. Pt will improve trunk ROM to>50% of WNL without pain increase for improved ADL ease.  - AROM Trunk ~50-75% WNL's. 10/15/2018 2. Pt will demonstrate left shoulder AROM flex and abduction to 120 deg each for improved self care and ADL ease. - AROM Left shoulder flexion 115 degrees and abd 104 degrees. 10/1/2018; flexion 135 degrees and abduction 115 degrees. . 10/15/2018 3. Pt will demonstrate 4-/5 B LE strength testing without breakaway for improved activity tolerance. - MMT (B) LE's 4-/5 to 4/5 grossly. 10/22/2018 4. Pt will report only a little difficulty looking up to see a bird for improved quality of life and grooming ease. - Met, Pt denies difficulty looking up to see a bird. 10/9/2018 PLAN 
[]  Upgrade activities as tolerated     []  Continue plan of care 
[]  Update interventions per flow sheet [x]  Discharge due to: Met most goals. Pt given discharge instructions. []  Other:_   
 
Anthony Cavanaugh PTA 10/22/2018  1:41 PM 
 
Future Appointments Date Time Provider Danni Soria 10/22/2018  2:00 PM Ayleen Ayala PTA [facility]
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
## 2019-08-01 ENCOUNTER — HOSPITAL ENCOUNTER (OUTPATIENT)
Dept: MAMMOGRAPHY | Age: 59
Discharge: HOME | End: 2019-08-01
Attending: INTERNAL MEDICINE
Payer: MEDICARE

## 2019-08-01 DIAGNOSIS — Z12.31 VISIT FOR SCREENING MAMMOGRAM: ICD-10-CM

## 2019-08-01 PROCEDURE — 77063 BREAST TOMOSYNTHESIS BI: CPT

## 2020-11-04 ENCOUNTER — HOSPITAL ENCOUNTER (OUTPATIENT)
Dept: MAMMOGRAPHY | Age: 60
Discharge: HOME | End: 2020-11-04
Attending: FAMILY MEDICINE
Payer: MEDICARE

## 2020-11-04 DIAGNOSIS — Z12.31 VISIT FOR SCREENING MAMMOGRAM: ICD-10-CM

## 2020-11-04 PROCEDURE — 77063 BREAST TOMOSYNTHESIS BI: CPT

## 2021-10-15 ENCOUNTER — TRANSCRIBE ORDER (OUTPATIENT)
Dept: SCHEDULING | Age: 61
End: 2021-10-15

## 2021-10-15 DIAGNOSIS — Z12.31 VISIT FOR SCREENING MAMMOGRAM: Primary | ICD-10-CM

## 2021-11-08 ENCOUNTER — HOSPITAL ENCOUNTER (OUTPATIENT)
Dept: MAMMOGRAPHY | Age: 61
Discharge: HOME | End: 2021-11-08
Attending: FAMILY MEDICINE
Payer: MEDICARE

## 2021-11-08 DIAGNOSIS — Z12.31 VISIT FOR SCREENING MAMMOGRAM: ICD-10-CM

## 2021-11-08 PROCEDURE — 77063 BREAST TOMOSYNTHESIS BI: CPT

## 2022-03-18 PROBLEM — M62.838 MUSCLE SPASM: Status: ACTIVE | Noted: 2017-02-28

## 2022-03-18 PROBLEM — M54.40 ACUTE BACK PAIN WITH SCIATICA: Status: ACTIVE | Noted: 2017-02-28

## 2022-03-18 PROBLEM — M17.12 OSTEOARTHRITIS OF LEFT KNEE: Status: ACTIVE | Noted: 2017-05-31

## 2022-03-19 PROBLEM — M47.816 LUMBAR FACET ARTHROPATHY: Status: ACTIVE | Noted: 2017-01-23

## 2022-03-19 PROBLEM — M79.2 NEURITIS: Status: ACTIVE | Noted: 2017-01-23

## 2022-03-20 PROBLEM — M62.830 MUSCLE SPASM OF BACK: Status: ACTIVE | Noted: 2017-03-21

## 2022-03-20 PROBLEM — M51.26 HNP (HERNIATED NUCLEUS PULPOSUS), LUMBAR: Status: ACTIVE | Noted: 2017-03-21

## 2022-10-18 ENCOUNTER — TRANSCRIBE ORDER (OUTPATIENT)
Dept: SCHEDULING | Age: 62
End: 2022-10-18

## 2022-10-18 DIAGNOSIS — Z12.31 ENCOUNTER FOR SCREENING MAMMOGRAM FOR BREAST CANCER: Primary | ICD-10-CM

## 2022-11-11 ENCOUNTER — HOSPITAL ENCOUNTER (OUTPATIENT)
Dept: MAMMOGRAPHY | Age: 62
Discharge: HOME | End: 2022-11-11
Attending: FAMILY MEDICINE
Payer: MEDICARE

## 2022-11-11 DIAGNOSIS — Z12.31 ENCOUNTER FOR SCREENING MAMMOGRAM FOR BREAST CANCER: ICD-10-CM

## 2022-11-11 PROCEDURE — 77063 BREAST TOMOSYNTHESIS BI: CPT

## 2023-10-23 ENCOUNTER — TRANSCRIBE ORDERS (OUTPATIENT)
Facility: HOSPITAL | Age: 63
End: 2023-10-23

## 2023-10-23 DIAGNOSIS — Z12.31 VISIT FOR SCREENING MAMMOGRAM: Primary | ICD-10-CM

## 2023-11-13 ENCOUNTER — HOSPITAL ENCOUNTER (OUTPATIENT)
Facility: HOSPITAL | Age: 63
Discharge: HOME | End: 2023-11-16
Payer: MEDICARE

## 2023-11-13 VITALS — HEIGHT: 61 IN | BODY MASS INDEX: 39.46 KG/M2 | WEIGHT: 209 LBS

## 2023-11-13 DIAGNOSIS — Z12.31 VISIT FOR SCREENING MAMMOGRAM: ICD-10-CM

## 2023-11-13 PROCEDURE — 77063 BREAST TOMOSYNTHESIS BI: CPT

## 2024-10-25 ENCOUNTER — TRANSCRIBE ORDERS (OUTPATIENT)
Facility: HOSPITAL | Age: 64
End: 2024-10-25

## 2024-10-25 DIAGNOSIS — Z12.31 OTHER SCREENING MAMMOGRAM: Primary | ICD-10-CM

## 2024-12-04 ENCOUNTER — HOSPITAL ENCOUNTER (OUTPATIENT)
Facility: HOSPITAL | Age: 64
Discharge: HOME | End: 2024-12-07
Payer: MEDICARE

## 2024-12-04 VITALS — HEIGHT: 61 IN | WEIGHT: 209 LBS | BODY MASS INDEX: 39.46 KG/M2

## 2024-12-04 DIAGNOSIS — Z12.31 OTHER SCREENING MAMMOGRAM: ICD-10-CM

## 2024-12-04 PROCEDURE — 77063 BREAST TOMOSYNTHESIS BI: CPT

## 2025-08-15 ENCOUNTER — TELEPHONE (OUTPATIENT)
Age: 65
End: 2025-08-15

## 2025-08-21 ENCOUNTER — OFFICE VISIT (OUTPATIENT)
Age: 65
End: 2025-08-21
Payer: MEDICARE

## 2025-08-21 VITALS
OXYGEN SATURATION: 99 % | TEMPERATURE: 97.3 F | SYSTOLIC BLOOD PRESSURE: 138 MMHG | DIASTOLIC BLOOD PRESSURE: 64 MMHG | HEART RATE: 63 BPM | HEIGHT: 66 IN | RESPIRATION RATE: 16 BRPM | WEIGHT: 194 LBS | BODY MASS INDEX: 31.18 KG/M2

## 2025-08-21 DIAGNOSIS — K63.89 CECUM MASS: Primary | ICD-10-CM

## 2025-08-21 PROCEDURE — 1123F ACP DISCUSS/DSCN MKR DOCD: CPT | Performed by: COLON & RECTAL SURGERY

## 2025-08-21 PROCEDURE — 1126F AMNT PAIN NOTED NONE PRSNT: CPT | Performed by: COLON & RECTAL SURGERY

## 2025-08-21 PROCEDURE — 99204 OFFICE O/P NEW MOD 45 MIN: CPT | Performed by: COLON & RECTAL SURGERY

## 2025-08-21 PROCEDURE — 1159F MED LIST DOCD IN RCRD: CPT | Performed by: COLON & RECTAL SURGERY

## 2025-08-21 RX ORDER — BISACODYL 5 MG
TABLET, DELAYED RELEASE (ENTERIC COATED) ORAL
Qty: 4 TABLET | Refills: 0 | Status: SHIPPED | OUTPATIENT
Start: 2025-08-21

## 2025-08-21 RX ORDER — POLYETHYLENE GLYCOL 3350 17 G/17G
POWDER, FOR SOLUTION ORAL
Qty: 238 G | Refills: 0 | Status: SHIPPED | OUTPATIENT
Start: 2025-08-21

## 2025-08-22 PROBLEM — K63.89 CECUM MASS: Status: ACTIVE | Noted: 2025-08-22

## 2025-08-22 RX ORDER — NEOMYCIN SULFATE 500 MG/1
1000 TABLET ORAL 3 TIMES DAILY
Qty: 6 TABLET | Refills: 0 | Status: SHIPPED | OUTPATIENT
Start: 2025-08-22 | End: 2025-08-23

## 2025-08-22 RX ORDER — METRONIDAZOLE 500 MG/1
500 TABLET ORAL 3 TIMES DAILY
Qty: 3 TABLET | Refills: 0 | Status: SHIPPED | OUTPATIENT
Start: 2025-08-22 | End: 2025-08-23